# Patient Record
Sex: MALE | Race: WHITE | NOT HISPANIC OR LATINO | Employment: FULL TIME | URBAN - METROPOLITAN AREA
[De-identification: names, ages, dates, MRNs, and addresses within clinical notes are randomized per-mention and may not be internally consistent; named-entity substitution may affect disease eponyms.]

---

## 2017-10-26 ENCOUNTER — OFFICE VISIT (OUTPATIENT)
Dept: MEDICAL GROUP | Facility: MEDICAL CENTER | Age: 72
End: 2017-10-26
Payer: COMMERCIAL

## 2017-10-26 VITALS
OXYGEN SATURATION: 95 % | RESPIRATION RATE: 16 BRPM | DIASTOLIC BLOOD PRESSURE: 68 MMHG | BODY MASS INDEX: 24 KG/M2 | TEMPERATURE: 98.6 F | HEART RATE: 78 BPM | SYSTOLIC BLOOD PRESSURE: 130 MMHG | WEIGHT: 193 LBS | HEIGHT: 75 IN

## 2017-10-26 DIAGNOSIS — H91.93 BILATERAL HEARING LOSS, UNSPECIFIED HEARING LOSS TYPE: ICD-10-CM

## 2017-10-26 DIAGNOSIS — K76.0 FATTY LIVER: ICD-10-CM

## 2017-10-26 DIAGNOSIS — G25.81 RESTLESS LEG SYNDROME: ICD-10-CM

## 2017-10-26 DIAGNOSIS — K21.9 GASTROESOPHAGEAL REFLUX DISEASE WITHOUT ESOPHAGITIS: ICD-10-CM

## 2017-10-26 DIAGNOSIS — J20.9 ACUTE BRONCHITIS, UNSPECIFIED ORGANISM: ICD-10-CM

## 2017-10-26 DIAGNOSIS — R97.20 ELEVATED PSA: ICD-10-CM

## 2017-10-26 DIAGNOSIS — Z12.11 SCREENING FOR COLON CANCER: ICD-10-CM

## 2017-10-26 DIAGNOSIS — R09.89 RIGHT CAROTID BRUIT: ICD-10-CM

## 2017-10-26 DIAGNOSIS — M47.22 OSTEOARTHRITIS OF SPINE WITH RADICULOPATHY, CERVICAL REGION: ICD-10-CM

## 2017-10-26 DIAGNOSIS — E78.49 OTHER HYPERLIPIDEMIA: ICD-10-CM

## 2017-10-26 DIAGNOSIS — Z87.09 HISTORY OF ASTHMA: ICD-10-CM

## 2017-10-26 DIAGNOSIS — F51.01 PRIMARY INSOMNIA: ICD-10-CM

## 2017-10-26 DIAGNOSIS — R73.9 HYPERGLYCEMIA: ICD-10-CM

## 2017-10-26 PROBLEM — M47.812 CERVICAL SPINE DEGENERATION: Status: ACTIVE | Noted: 2017-10-26

## 2017-10-26 PROCEDURE — 99204 OFFICE O/P NEW MOD 45 MIN: CPT | Performed by: PHYSICIAN ASSISTANT

## 2017-10-26 RX ORDER — BUDESONIDE AND FORMOTEROL FUMARATE DIHYDRATE 160; 4.5 UG/1; UG/1
2 AEROSOL RESPIRATORY (INHALATION) 2 TIMES DAILY
Qty: 1 INHALER | Refills: 6 | Status: SHIPPED | OUTPATIENT
Start: 2017-10-26 | End: 2018-04-30

## 2017-10-26 RX ORDER — MONTELUKAST SODIUM 10 MG/1
10 TABLET ORAL DAILY
Qty: 30 TAB | Refills: 6 | Status: SHIPPED | OUTPATIENT
Start: 2017-10-26 | End: 2018-07-10 | Stop reason: SDUPTHER

## 2017-10-26 RX ORDER — DOXYCYCLINE HYCLATE 100 MG
100 TABLET ORAL 2 TIMES DAILY
Qty: 20 TAB | Refills: 0 | Status: SHIPPED | OUTPATIENT
Start: 2017-10-26 | End: 2017-11-05

## 2017-10-26 RX ORDER — ZOLPIDEM TARTRATE 10 MG/1
TABLET ORAL
COMMUNITY
Start: 2017-09-08 | End: 2018-04-30

## 2017-10-26 RX ORDER — MECOBALAMIN 5000 MCG
15 TABLET,DISINTEGRATING ORAL
COMMUNITY
End: 2018-04-30

## 2017-10-26 RX ORDER — TADALAFIL 20 MG/1
20 TABLET ORAL PRN
COMMUNITY
End: 2018-03-23 | Stop reason: SDUPTHER

## 2017-10-26 RX ORDER — GABAPENTIN 100 MG/1
CAPSULE ORAL
Refills: 0 | COMMUNITY
Start: 2017-08-03 | End: 2018-03-23 | Stop reason: SDUPTHER

## 2017-10-26 RX ORDER — CHLORAL HYDRATE 500 MG
1000 CAPSULE ORAL
COMMUNITY

## 2017-10-26 RX ORDER — FLUTICASONE PROPIONATE 50 MCG
1 SPRAY, SUSPENSION (ML) NASAL DAILY
COMMUNITY

## 2017-10-26 RX ORDER — ALFUZOSIN HYDROCHLORIDE 10 MG/1
10 TABLET, EXTENDED RELEASE ORAL
COMMUNITY
End: 2018-10-09 | Stop reason: SDUPTHER

## 2017-10-26 ASSESSMENT — PATIENT HEALTH QUESTIONNAIRE - PHQ9: CLINICAL INTERPRETATION OF PHQ2 SCORE: 0

## 2017-10-26 NOTE — PROGRESS NOTES
Chief Complaint   Patient presents with   • Establish Care     labs - had high A1c in last check 6 mo ago   • Other     chest congestion - started x 3 weeks, took antibiotics, went away and then came back, productive cough   • Shoulder Pain     nerve pain - chronic, don't want to do surgery or shots, would like to do PT   • Other     ear pressure, hearing issues, old ENT wanted to do MRI, talked about ear tubes       HISTORY OF THE PRESENT ILLNESS: This is a 72 y.o. male new patient to our practice. This pleasant patient is here today to discuss multiple acute and chronic issues    History of asthma  Triggered with allergy and with viruses. Pulmonology testing 10 years ago showed some mild emphysema. Doctor in [place] had prescribed inhalers for PRN use. Since moving getting worse. Took 6-7 days of amoxicillin and felt better but now coming back. Coughing up some yellow phlegm. Getting hoarse. Advair has caused voice change in the past. Interested in different inhaler. Concerned he might need an antibiotic.    Cervical spine degeneration  Neurologist and ortho did MRI. C3-4 protruding. Causes left shoulder pain. Has lost some nerve sensitivity in the left arm. Neuro and PT said to stay on top physical therapy. Last MRI2 years ago. No surgeries.    Restless leg syndrome  Chronic. Fairly well controlled on gabapentin and ambien.  Has seen neuro in [place]. Would like to see neuro here in [place].    Elevated PSA  Hx of elevated PSA. Prostate biopsy 6 years ago biopsy normal. PSA due every year.     Hyperglycemia  Hx of elevated HgA1C. No hx of diabetes. Due for retest.    Fatty liver  History of. Found on ultrasound. No f/u was rec.    Insomnia  Chronic. Currently doing relatively well on ambien and gabapentin without adverse effects.    Right carotid bruit  Has had carotid ultrasound. Was advised to have f/u in about 2-3 years. Not on statin. Not on aspirin d/t GERD    Gastroesophageal reflux disease without  esophagitis  Chronic. On prevacid.     Other hyperlipidemia  History of elevation. States improved on red yeast rice, co enzyme Q10, fish oil. Due for testing.    Bilateral hearing loss  Hx of noise induced hearing loss. Worse over the past year. ENT and audiology in [place] though middle ear cause. Had advised MRI but were then planning on doing either tympanoplasty or steroid injections into the middle ear. Moved before any of that was done. Would like to have MRI then see ENT here.    Screening for colon cancer  Hx of multiple polyps. Due for colonoscopy      Past Medical History:   Diagnosis Date   • Allergic rhinitis    • Asthma    • Asymptomatic stenosis of right carotid artery    • BPH (benign prostatic hyperplasia)    • Erectile dysfunction    • Fatty liver    • GERD (gastroesophageal reflux disease)    • Insomnia    • Restless leg syndrome        History reviewed. No pertinent surgical history.    Family Status   Relation Status   • Mother    • Father    • Sister Alive     Family History   Problem Relation Age of Onset   • Lung Disease Mother      COPD   • Cancer Father        Social History   Substance Use Topics   • Smoking status: Never Smoker   • Smokeless tobacco: Never Used   • Alcohol use Yes      Comment: 1-2 drinks per month       Allergies: Sulfa drugs    Current Outpatient Prescriptions Ordered in [facility]   Medication Sig Dispense Refill   • gabapentin (NEURONTIN) 100 MG Cap   0   • lansoprazole (PREVACID) 15 MG CAPSULE DELAYED RELEASE Take 15 mg by mouth.     • zolpidem (AMBIEN) 10 MG Tab 1/2- 1 tab nightly as needed for insomnia     • alfuzosin (UROXATRAL) 10 MG SR tablet Take 10 mg by mouth.     • tadalafil (CIALIS) 20 MG tablet Take 20 mg by mouth as needed for Erectile Dysfunction.     • vitamin D (CHOLECALCIFEROL) 1000 UNIT Tab Take 1,000 Units by mouth every day.     • fluticasone (FLONASE) 50 MCG/ACT nasal spray Spray 1 Spray in nose every day.     • multivitamin  "(THERAGRAN) Tab Take 1 Tab by mouth every day.     • Red Yeast Rice Extract (RED YEAST RICE PO) Take  by mouth.     • Omega-3 Fatty Acids (FISH OIL) 1000 MG Cap capsule Take 1,000 mg by mouth 3 times a day, with meals.     • budesonide-formoterol (SYMBICORT) 160-4.5 MCG/ACT Aerosol Inhale 2 Puffs by mouth 2 Times a Day. 1 Inhaler 6   • montelukast (SINGULAIR) 10 MG Tab Take 1 Tab by mouth every day. 30 Tab 6   • doxycycline (VIBRAMYCIN) 100 MG Tab Take 1 Tab by mouth 2 times a day for 10 days. 20 Tab 0   • omeprazole (PRILOSEC) 20 MG CPDR Take 20 mg by mouth every day.       No current [facility]-ordered facility-administered medications on file.        Review of Systems   Constitutional: Negative for fever, chills, weight loss and malaise/fatigue.   HENT: Negative for ear pain, nosebleeds, sore throat     Eyes: Negative for blurred vision.   Cardiovascular: Negative for chest pain, palpitations, orthopnea and leg swelling.   Gastrointestinal: Negative for heartburn, nausea, vomiting and abdominal pain.   Genitourinary: Negative for dysuria, urgency and frequency.   Skin: Negative for rash and itching.   Neurological: Negative for dizziness, tingling, tremors, sensory change, focal weakness and headaches.   Endo/Heme/Allergies: Does not bruise/bleed easily.   Psychiatric/Behavioral: Negative for depression, anxiety, or memory loss.     All other systems reviewed and are negative except as in HPI.    Exam: Blood pressure 130/68, pulse 78, temperature 37 °C (98.6 °F), resp. rate 16, height 1.905 m (6' 3\"), weight 87.5 kg (193 lb), SpO2 95 %.  General: Normal appearing. No distress.  HEENT: Normocephalic. Eyes conjunctiva clear lids without ptosis, pupils equal and reactive to light accommodation, ears normal shape and contour, canals are clear bilaterally, tympanic membranes are benign, nasal mucosa benign, oropharynx is without erythema, edema or exudates.   Neck: Supple without JVD or bruit. Thyroid is not " enlarged.  Pulmonary: Clear to ausculation.  Normal effort. No rales, ronchi, or wheezing.  Cardiovascular: Regular rate and rhythm without murmur. Carotid and radial pulses are intact and equal bilaterally.  Abdomen: Soft, nontender, nondistended. Normal bowel sounds. Liver and spleen are not palpable  Neurologic: Grossly nonfocal  Lymph: No cervical, supraclavicular or axillary lymph nodes are palpable  Skin: Warm and dry.  No obvious lesions.  Musculoskeletal: Normal gait. No extremity cyanosis, clubbing, or edema.  Psych: Normal mood and affect. Alert and oriented x3. Judgment and insight is normal.    Assessment/Plan  1. Hyperglycemia  COMP METABOLIC PANEL    HEMOGLOBIN A1C   2. Gastroesophageal reflux disease without esophagitis     3. History of asthma  budesonide-formoterol (SYMBICORT) 160-4.5 MCG/ACT Aerosol    montelukast (SINGULAIR) 10 MG Tab   4. Osteoarthritis of spine with radiculopathy, cervical region  REFERRAL TO PHYSICAL THERAPY Reason for Therapy: Eval/Treat/Report   5. Restless leg syndrome  REFERRAL TO NEUROLOGY   6. Screening for colon cancer  REFERRAL TO GASTROENTEROLOGY   7. Elevated PSA  PROSTATE SPECIFIC AG SCREENING   8. Bilateral hearing loss, unspecified hearing loss type  REFERRAL TO ENT    CBC WITH DIFFERENTIAL    MR-BRAIN IAC'S W/WO   9. Other hyperlipidemia  LIPID PROFILE   10. Acute bronchitis, unspecified organism  CBC WITH DIFFERENTIAL    doxycycline (VIBRAMYCIN) 100 MG Tab   11. Fatty liver     12. Primary insomnia     13. Right carotid bruit       Will get records. Will get labs and MRI. Will f/u regarding new inhaler, antibitoic.   In general will need q 6 mo f/u

## 2017-10-26 NOTE — ASSESSMENT & PLAN NOTE
Neurologist and ortho did MRI. C3-4 protruding. Causes left shoulder pain. Has lost some nerve sensitivity in the left arm. Neuro and PT said to stay on top physical therapy. Last MRI2 years ago. No surgeries.

## 2017-10-26 NOTE — LETTER
[facility]  Leisa Fink P.A.-C.  [address]  [place] [address]  Fax: [phone]   Authorization for Release/Disclosure of   Protected Health Information   Name: JOHNNY IRAHETA : 1945 SSN: [id number]   Address: [address] [place] [address] [facility] [address] Phone:    [phone] (home)    I authorize the entity listed below to release/disclose the PHI below to:   [facility]/Leisa Fink P.A.-C. and Leisa Fink P.A.-C.   Provider or Entity Name:     Address   City, State, Zip   Phone:      Fax:     Reason for request: continuity of care   Information to be released:    [  ] LAST COLONOSCOPY,  including any PATH REPORT and follow-up  [  ] LAST FIT/COLOGUARD RESULT [  ] LAST DEXA  [  ] LAST MAMMOGRAM  [  ] LAST PAP  [  ] LAST LABS [  ] RETINA EXAM REPORT  [  ] IMMUNIZATION RECORDS  [  ] Release all info      [  ] Check here and initial the line next to each item to release ALL health information INCLUDING  _____ Care and treatment for drug and / or alcohol abuse  _____ HIV testing, infection status, or AIDS  _____ Genetic Testing    DATES OF SERVICE OR TIME PERIOD TO BE DISCLOSED: _____________  I understand and acknowledge that:  * This Authorization may be revoked at any time by you in writing, except if your health information has already been used or disclosed.  * Your health information that will be used or disclosed as a result of you signing this authorization could be re-disclosed by the recipient. If this occurs, your re-disclosed health information may no longer be protected by State or Federal laws.  * You may refuse to sign this Authorization. Your refusal will not affect your ability to obtain treatment.  * This Authorization becomes effective upon signing and will  on (date) __________.      If no date is indicated, this Authorization will  one (1) year from the signature date.    Name: Johnny Iraheta    Signature:   Date:     10/26/2017       PLEASE FAX  REQUESTED RECORDS BACK TO: [phone]

## 2017-10-26 NOTE — ASSESSMENT & PLAN NOTE
Has had carotid ultrasound. Was advised to have f/u in about 2-3 years. Not on statin. Not on aspirin d/t GERD

## 2017-10-26 NOTE — ASSESSMENT & PLAN NOTE
History of elevation. States improved on red yeast rice, co enzyme Q10, fish oil. Due for testing.

## 2017-10-26 NOTE — ASSESSMENT & PLAN NOTE
Chronic. Fairly well controlled on gabapentin and ambien.  Has seen neuro in [place]. Would like to see neuro here in [place].

## 2017-10-26 NOTE — ASSESSMENT & PLAN NOTE
Triggered with allergy and with viruses. Pulmonology testing 10 years ago showed some mild emphysema. Doctor in [place] had prescribed inhalers for PRN use. Since moving getting worse. Took 6-7 days of amoxicillin and felt better but now coming back. Coughing up some yellow phlegm. Getting hoarse. Advair has caused voice change in the past. Interested in different inhaler. Concerned he might need an antibiotic.

## 2017-10-30 ENCOUNTER — TELEPHONE (OUTPATIENT)
Dept: MEDICAL GROUP | Facility: MEDICAL CENTER | Age: 72
End: 2017-10-30

## 2017-10-30 DIAGNOSIS — F40.240 CLAUSTROPHOBIA: ICD-10-CM

## 2017-10-30 RX ORDER — DIAZEPAM 5 MG/1
5 TABLET ORAL ONCE
Qty: 1 TAB | Refills: 0 | Status: SHIPPED
Start: 2017-10-30 | End: 2017-10-30

## 2017-10-31 ENCOUNTER — PATIENT MESSAGE (OUTPATIENT)
Dept: MEDICAL GROUP | Facility: MEDICAL CENTER | Age: 72
End: 2017-10-31

## 2017-10-31 DIAGNOSIS — F51.01 PRIMARY INSOMNIA: ICD-10-CM

## 2017-10-31 RX ORDER — ZOLPIDEM TARTRATE 10 MG/1
10 TABLET ORAL NIGHTLY PRN
Qty: 30 TAB | Refills: 6 | Status: SHIPPED
Start: 2017-10-31 | End: 2018-05-30 | Stop reason: SDUPTHER

## 2017-10-31 NOTE — TELEPHONE ENCOUNTER
From: Johnny Iraheta  To: Leisa Fink P.A.-C.  Sent: 10/31/2017 10:39 AM PDT  Subject: Non-Urgent Medical Question    First, thank you for seeing my wife and I and taking us in to your practice. Terrific team, very impressed with DEANDRA De La Fuente and Nurse Jailyn.    Second, thank you for the call Jailyn re  prescription for the my upcoming MRI..    Third, both the MRI referral and Colonoscopy referral are both set, appreciate how quick that occurred.    Fourth, Should I try and book a physical therapy based on who is covered by my primary insurance and proximity.    Fifth and lastly, generic ambien, Zolpidem Tartrate. Could I transfer that prescription to you. Would be good to do the 20 MG, and cut in half on non travel nights ready for a refill, traveling again leaving Thursday be back on Tuesday I believe my last refill runs out on Sat.    Thank you,  Solitario

## 2017-11-07 ENCOUNTER — TELEPHONE (OUTPATIENT)
Dept: MEDICAL GROUP | Facility: MEDICAL CENTER | Age: 72
End: 2017-11-07

## 2017-11-07 NOTE — TELEPHONE ENCOUNTER
----- Message from Teresita Márquez sent at 11/7/2017 11:28 AM PST -----  Regarding: Referral to PT   Contact: [phone]  Jay Jaeger from [facility] called today regarding this pt - I did let him know it wouldn't be immediate as you're seeing patients today, but did let him know he could expect a call back sometime today, he's wondering if he can get a copy of a referral for this pt. either Jay or Mery () at [phone]    Teresita

## 2017-11-10 ENCOUNTER — HOSPITAL ENCOUNTER (OUTPATIENT)
Dept: LAB | Facility: MEDICAL CENTER | Age: 72
End: 2017-11-10
Attending: PHYSICIAN ASSISTANT
Payer: OTHER MISCELLANEOUS

## 2017-11-10 DIAGNOSIS — R97.20 ELEVATED PSA: ICD-10-CM

## 2017-11-10 DIAGNOSIS — H91.93 BILATERAL HEARING LOSS, UNSPECIFIED HEARING LOSS TYPE: ICD-10-CM

## 2017-11-10 DIAGNOSIS — E78.49 OTHER HYPERLIPIDEMIA: ICD-10-CM

## 2017-11-10 DIAGNOSIS — J20.9 ACUTE BRONCHITIS, UNSPECIFIED ORGANISM: ICD-10-CM

## 2017-11-10 DIAGNOSIS — R73.9 HYPERGLYCEMIA: ICD-10-CM

## 2017-11-10 LAB
ALBUMIN SERPL BCP-MCNC: 4.4 G/DL (ref 3.2–4.9)
ALBUMIN/GLOB SERPL: 1.6 G/DL
ALP SERPL-CCNC: 55 U/L (ref 30–99)
ALT SERPL-CCNC: 20 U/L (ref 2–50)
ANION GAP SERPL CALC-SCNC: 6 MMOL/L (ref 0–11.9)
AST SERPL-CCNC: 19 U/L (ref 12–45)
BASOPHILS # BLD AUTO: 0.9 % (ref 0–1.8)
BASOPHILS # BLD: 0.06 K/UL (ref 0–0.12)
BILIRUB SERPL-MCNC: 0.6 MG/DL (ref 0.1–1.5)
BUN SERPL-MCNC: 19 MG/DL (ref 8–22)
CALCIUM SERPL-MCNC: 9.5 MG/DL (ref 8.5–10.5)
CHLORIDE SERPL-SCNC: 104 MMOL/L (ref 96–112)
CHOLEST SERPL-MCNC: 184 MG/DL (ref 100–199)
CO2 SERPL-SCNC: 29 MMOL/L (ref 20–33)
CREAT SERPL-MCNC: 0.97 MG/DL (ref 0.5–1.4)
EOSINOPHIL # BLD AUTO: 0.11 K/UL (ref 0–0.51)
EOSINOPHIL NFR BLD: 1.7 % (ref 0–6.9)
ERYTHROCYTE [DISTWIDTH] IN BLOOD BY AUTOMATED COUNT: 42.5 FL (ref 35.9–50)
EST. AVERAGE GLUCOSE BLD GHB EST-MCNC: 148 MG/DL
GFR SERPL CREATININE-BSD FRML MDRD: >60 ML/MIN/1.73 M 2
GLOBULIN SER CALC-MCNC: 2.8 G/DL (ref 1.9–3.5)
GLUCOSE SERPL-MCNC: 107 MG/DL (ref 65–99)
HBA1C MFR BLD: 6.8 % (ref 0–5.6)
HCT VFR BLD AUTO: 45.1 % (ref 42–52)
HDLC SERPL-MCNC: 34 MG/DL
HGB BLD-MCNC: 15.3 G/DL (ref 14–18)
IMM GRANULOCYTES # BLD AUTO: 0.01 K/UL (ref 0–0.11)
IMM GRANULOCYTES NFR BLD AUTO: 0.2 % (ref 0–0.9)
LDLC SERPL CALC-MCNC: 126 MG/DL
LYMPHOCYTES # BLD AUTO: 1.44 K/UL (ref 1–4.8)
LYMPHOCYTES NFR BLD: 22.7 % (ref 22–41)
MCH RBC QN AUTO: 30.8 PG (ref 27–33)
MCHC RBC AUTO-ENTMCNC: 33.9 G/DL (ref 33.7–35.3)
MCV RBC AUTO: 90.9 FL (ref 81.4–97.8)
MONOCYTES # BLD AUTO: 0.56 K/UL (ref 0–0.85)
MONOCYTES NFR BLD AUTO: 8.8 % (ref 0–13.4)
NEUTROPHILS # BLD AUTO: 4.15 K/UL (ref 1.82–7.42)
NEUTROPHILS NFR BLD: 65.7 % (ref 44–72)
NRBC # BLD AUTO: 0 K/UL
NRBC BLD AUTO-RTO: 0 /100 WBC
PLATELET # BLD AUTO: 221 K/UL (ref 164–446)
PMV BLD AUTO: 11.4 FL (ref 9–12.9)
POTASSIUM SERPL-SCNC: 4.5 MMOL/L (ref 3.6–5.5)
PROT SERPL-MCNC: 7.2 G/DL (ref 6–8.2)
PSA SERPL-MCNC: 8.26 NG/ML (ref 0–4)
RBC # BLD AUTO: 4.96 M/UL (ref 4.7–6.1)
SODIUM SERPL-SCNC: 139 MMOL/L (ref 135–145)
TRIGL SERPL-MCNC: 119 MG/DL (ref 0–149)
WBC # BLD AUTO: 6.3 K/UL (ref 4.8–10.8)

## 2017-11-10 PROCEDURE — 84153 ASSAY OF PSA TOTAL: CPT

## 2017-11-10 PROCEDURE — 83036 HEMOGLOBIN GLYCOSYLATED A1C: CPT

## 2017-11-10 PROCEDURE — 80061 LIPID PANEL: CPT

## 2017-11-10 PROCEDURE — 36415 COLL VENOUS BLD VENIPUNCTURE: CPT

## 2017-11-10 PROCEDURE — 80053 COMPREHEN METABOLIC PANEL: CPT

## 2017-11-10 PROCEDURE — 85025 COMPLETE CBC W/AUTO DIFF WBC: CPT

## 2017-11-13 ENCOUNTER — HOSPITAL ENCOUNTER (OUTPATIENT)
Dept: RADIOLOGY | Facility: MEDICAL CENTER | Age: 72
End: 2017-11-13
Attending: PHYSICIAN ASSISTANT
Payer: OTHER MISCELLANEOUS

## 2017-11-13 ENCOUNTER — TELEPHONE (OUTPATIENT)
Dept: MEDICAL GROUP | Facility: MEDICAL CENTER | Age: 72
End: 2017-11-13

## 2017-11-13 DIAGNOSIS — H91.93 BILATERAL HEARING LOSS, UNSPECIFIED HEARING LOSS TYPE: ICD-10-CM

## 2017-11-13 PROCEDURE — 700117 HCHG RX CONTRAST REV CODE 255: Performed by: PHYSICIAN ASSISTANT

## 2017-11-13 PROCEDURE — 70553 MRI BRAIN STEM W/O & W/DYE: CPT

## 2017-11-13 PROCEDURE — A9579 GAD-BASE MR CONTRAST NOS,1ML: HCPCS | Performed by: PHYSICIAN ASSISTANT

## 2017-11-13 RX ADMIN — GADODIAMIDE 20 ML: 287 INJECTION INTRAVENOUS at 08:46

## 2017-11-13 NOTE — TELEPHONE ENCOUNTER
----- Message from Leisa Fink P.A.-C. sent at 11/13/2017  9:47 AM PST -----  Please call and verify patient receives results. Thanks! Leisa Fink PA-C

## 2018-01-15 ENCOUNTER — PATIENT MESSAGE (OUTPATIENT)
Dept: MEDICAL GROUP | Facility: MEDICAL CENTER | Age: 73
End: 2018-01-15

## 2018-01-15 DIAGNOSIS — N52.9 ERECTILE DYSFUNCTION, UNSPECIFIED ERECTILE DYSFUNCTION TYPE: ICD-10-CM

## 2018-01-16 RX ORDER — TADALAFIL 20 MG/1
20 TABLET ORAL PRN
Qty: 4 TAB | Refills: 6 | Status: SHIPPED | OUTPATIENT
Start: 2018-01-16 | End: 2018-02-15

## 2018-03-05 ENCOUNTER — OFFICE VISIT (OUTPATIENT)
Dept: MEDICAL GROUP | Facility: CLINIC | Age: 73
End: 2018-03-05
Payer: OTHER MISCELLANEOUS

## 2018-03-05 VITALS
DIASTOLIC BLOOD PRESSURE: 70 MMHG | HEART RATE: 70 BPM | WEIGHT: 200 LBS | OXYGEN SATURATION: 97 % | TEMPERATURE: 98.2 F | BODY MASS INDEX: 25 KG/M2 | SYSTOLIC BLOOD PRESSURE: 124 MMHG | RESPIRATION RATE: 16 BRPM

## 2018-03-05 DIAGNOSIS — J40 BRONCHITIS: ICD-10-CM

## 2018-03-05 PROCEDURE — 99213 OFFICE O/P EST LOW 20 MIN: CPT | Performed by: NURSE PRACTITIONER

## 2018-03-05 RX ORDER — DOXYCYCLINE HYCLATE 100 MG
100 TABLET ORAL 2 TIMES DAILY
Qty: 14 TAB | Refills: 0 | Status: SHIPPED | OUTPATIENT
Start: 2018-03-05 | End: 2018-03-05

## 2018-03-05 RX ORDER — DOXYCYCLINE HYCLATE 100 MG
100 TABLET ORAL 2 TIMES DAILY
Qty: 14 TAB | Refills: 0 | Status: SHIPPED | OUTPATIENT
Start: 2018-03-05 | End: 2018-03-12

## 2018-03-05 ASSESSMENT — ENCOUNTER SYMPTOMS
SHORTNESS OF BREATH: 0
MYALGIAS: 0
SPUTUM PRODUCTION: 1
SORE THROAT: 0
COUGH: 1
WHEEZING: 0
FEVER: 0
SINUS PAIN: 0

## 2018-03-05 NOTE — PROGRESS NOTES
Chief Complaint   Patient presents with   • URI     stuffy nose/ post nasal drip productive cough x        HISTORY OF PRESENT ILLNESS: Patient is a 73 y.o. male established patient who presents today due to 4 days hx of stuffy nose, post nasal drip and productive coughing. Pt reports he has similar condition in the past which require antibiotic to get riddle of it. He is afraid to wait and end up having pneumonia. He reports no fever or chills, no sob or wheezing. He is going to travel tomorrow. He has inhaler, symbicort, on singular for his asthma. He also uses flonase. He does not appear sign of asthma exacerbation at this time.       Patient Active Problem List    Diagnosis Date Noted   • Hyperglycemia 10/26/2017   • Gastroesophageal reflux disease without esophagitis 10/26/2017   • History of asthma 10/26/2017   • Cervical spine degeneration 10/26/2017   • Other hyperlipidemia 10/26/2017   • Bilateral hearing loss 10/26/2017   • Screening for colon cancer 10/26/2017   • Elevated PSA 06/30/2016   • Insomnia 06/30/2016   • Restless leg syndrome 06/30/2016   • Right carotid bruit 06/30/2016   • Fatty liver 06/14/2016       Allergies:Sulfa drugs    Current Outpatient Prescriptions   Medication Sig Dispense Refill   • doxycycline (VIBRAMYCIN) 100 MG Tab Take 1 Tab by mouth 2 times a day for 7 days. 14 Tab 0   • zolpidem (AMBIEN) 10 MG Tab Take 1 Tab by mouth at bedtime as needed for Sleep. 30 Tab 6   • lansoprazole (PREVACID) 15 MG CAPSULE DELAYED RELEASE Take 15 mg by mouth.     • alfuzosin (UROXATRAL) 10 MG SR tablet Take 10 mg by mouth.     • vitamin D (CHOLECALCIFEROL) 1000 UNIT Tab Take 1,000 Units by mouth every day.     • multivitamin (THERAGRAN) Tab Take 1 Tab by mouth every day.     • Red Yeast Rice Extract (RED YEAST RICE PO) Take  by mouth.     • Omega-3 Fatty Acids (FISH OIL) 1000 MG Cap capsule Take 1,000 mg by mouth 3 times a day, with meals.     • budesonide-formoterol (SYMBICORT) 160-4.5 MCG/ACT  Aerosol Inhale 2 Puffs by mouth 2 Times a Day. 1 Inhaler 6   • montelukast (SINGULAIR) 10 MG Tab Take 1 Tab by mouth every day. 30 Tab 6   • gabapentin (NEURONTIN) 100 MG Cap   0   • zolpidem (AMBIEN) 10 MG Tab 1/2- 1 tab nightly as needed for insomnia     • tadalafil (CIALIS) 20 MG tablet Take 20 mg by mouth as needed for Erectile Dysfunction.     • fluticasone (FLONASE) 50 MCG/ACT nasal spray Spray 1 Spray in nose every day.     • omeprazole (PRILOSEC) 20 MG CPDR Take 20 mg by mouth every day.       No current facility-administered medications for this visit.        Social History   Substance Use Topics   • Smoking status: Never Smoker   • Smokeless tobacco: Never Used   • Alcohol use Yes      Comment: 1-2 drinks per month       Family Status   Relation Status   • Mother    • Father    • Sister Alive     Family History   Problem Relation Age of Onset   • Lung Disease Mother      COPD   • Cancer Father          ROS:  Review of Systems   Constitutional: Negative for fever.   HENT: Negative for congestion, sinus pain and sore throat.    Respiratory: Positive for cough and sputum production. Negative for shortness of breath and wheezing.    Musculoskeletal: Negative for myalgias.        Objective:     Blood pressure 124/70, pulse 70, temperature 36.8 °C (98.2 °F), resp. rate 16, weight 90.7 kg (200 lb), SpO2 97 %.     Physical Exam:  Physical Exam   Constitutional: He is oriented to person, place, and time and well-developed, well-nourished, and in no distress.   HENT:   Head: Normocephalic and atraumatic.   Eyes: Conjunctivae are normal.   Neck: Neck supple. No JVD present.   Cardiovascular: Regular rhythm.    Pulmonary/Chest: Breath sounds normal. No respiratory distress. He has no wheezes. He has no rales.   Musculoskeletal: Normal range of motion.   Neurological: He is alert and oriented to person, place, and time.   Skin: Skin is warm. No erythema.   Vitals reviewed.        Assessment/Plan:  1.  Bronchitis  Take abx only if worsening symptoms in two days.   - doxycycline (VIBRAMYCIN) 100 MG Tab; Take 1 Tab by mouth 2 times a day for 7 days.  Dispense: 14 Tab; Refill: 0  - can take OTC coughing medicine. Pt can fill and carry back up antibiotic with him when traveling.   - Pt education regarding bronchitis is provided.     Differential diagnoses and indications for immediate follow-up discussed with patient.    Instructed to return to clinic or nearest emergency department for any change in condition, further concerns, or worsening of symptoms.    The patient demonstrated a good understanding and agreed with the treatment plan.

## 2018-03-05 NOTE — PATIENT INSTRUCTIONS

## 2018-03-23 ENCOUNTER — PATIENT MESSAGE (OUTPATIENT)
Dept: MEDICAL GROUP | Facility: MEDICAL CENTER | Age: 73
End: 2018-03-23

## 2018-03-23 RX ORDER — GABAPENTIN 100 MG/1
100 CAPSULE ORAL 3 TIMES DAILY
Qty: 90 CAP | Refills: 3 | Status: SHIPPED | OUTPATIENT
Start: 2018-03-23 | End: 2018-09-20 | Stop reason: SDUPTHER

## 2018-03-23 RX ORDER — TADALAFIL 20 MG/1
20 TABLET ORAL PRN
Qty: 10 TAB | Refills: 3 | Status: SHIPPED | OUTPATIENT
Start: 2018-03-23 | End: 2018-04-30 | Stop reason: SDUPTHER

## 2018-03-23 NOTE — TELEPHONE ENCOUNTER
From: Johnny Page  To: Leisa Fink P.A.-C.  Sent: 3/23/2018 1:54 PM PDT  Subject: Prescription Question    Hello,  Could you please refill two prescriptions; Cialis and Gabapentin. We have changed pharmacies we are now using [facility] in [place].  All else is good for me.  Thanks,  Solitario

## 2018-04-30 ENCOUNTER — OFFICE VISIT (OUTPATIENT)
Dept: MEDICAL GROUP | Facility: PHYSICIAN GROUP | Age: 73
End: 2018-04-30
Payer: OTHER MISCELLANEOUS

## 2018-04-30 VITALS
RESPIRATION RATE: 19 BRPM | HEIGHT: 75 IN | HEART RATE: 74 BPM | TEMPERATURE: 98 F | WEIGHT: 201.9 LBS | OXYGEN SATURATION: 97 % | DIASTOLIC BLOOD PRESSURE: 74 MMHG | BODY MASS INDEX: 25.1 KG/M2 | SYSTOLIC BLOOD PRESSURE: 112 MMHG

## 2018-04-30 DIAGNOSIS — R97.20 ELEVATED PSA: ICD-10-CM

## 2018-04-30 DIAGNOSIS — E78.49 OTHER HYPERLIPIDEMIA: ICD-10-CM

## 2018-04-30 DIAGNOSIS — G25.81 RESTLESS LEG SYNDROME: ICD-10-CM

## 2018-04-30 DIAGNOSIS — Z87.09 HISTORY OF ASTHMA: ICD-10-CM

## 2018-04-30 DIAGNOSIS — E11.9 DIABETES MELLITUS TYPE 2, DIET-CONTROLLED (HCC): ICD-10-CM

## 2018-04-30 DIAGNOSIS — N52.9 ERECTILE DYSFUNCTION, UNSPECIFIED ERECTILE DYSFUNCTION TYPE: ICD-10-CM

## 2018-04-30 DIAGNOSIS — K92.1 HEMATOCHEZIA: ICD-10-CM

## 2018-04-30 DIAGNOSIS — F51.01 PRIMARY INSOMNIA: ICD-10-CM

## 2018-04-30 DIAGNOSIS — J45.40 MODERATE PERSISTENT ASTHMA WITHOUT COMPLICATION: ICD-10-CM

## 2018-04-30 PROBLEM — H91.13 PRESBYCUSIS OF BOTH EARS: Status: ACTIVE | Noted: 2018-04-30

## 2018-04-30 PROCEDURE — 99214 OFFICE O/P EST MOD 30 MIN: CPT | Performed by: FAMILY MEDICINE

## 2018-04-30 RX ORDER — TADALAFIL 20 MG/1
20 TABLET ORAL PRN
Qty: 4 TAB | Refills: 5 | Status: SHIPPED | OUTPATIENT
Start: 2018-04-30 | End: 2018-07-10 | Stop reason: SDUPTHER

## 2018-05-02 ENCOUNTER — TELEPHONE (OUTPATIENT)
Dept: MEDICAL GROUP | Facility: PHYSICIAN GROUP | Age: 73
End: 2018-05-02

## 2018-05-02 PROBLEM — Z12.11 SCREENING FOR COLON CANCER: Status: RESOLVED | Noted: 2017-10-26 | Resolved: 2018-05-02

## 2018-05-02 PROBLEM — R73.9 HYPERGLYCEMIA: Status: RESOLVED | Noted: 2017-10-26 | Resolved: 2018-05-02

## 2018-05-02 PROBLEM — H91.93 BILATERAL HEARING LOSS: Status: RESOLVED | Noted: 2017-10-26 | Resolved: 2018-05-02

## 2018-05-02 PROBLEM — J45.40 MODERATE PERSISTENT ASTHMA WITHOUT COMPLICATION: Status: ACTIVE | Noted: 2017-10-26

## 2018-05-02 RX ORDER — TRAZODONE HYDROCHLORIDE 50 MG/1
50 TABLET ORAL NIGHTLY PRN
Qty: 60 TAB | Refills: 0 | Status: SHIPPED | OUTPATIENT
Start: 2018-05-02 | End: 2018-05-30

## 2018-05-02 NOTE — ASSESSMENT & PLAN NOTE
A hemoglobin A1c done in the last few months was 6.7. He is not aware that he has blood sugar issues within the range of diabetes. He is on any medications for this and denies symptoms.

## 2018-05-02 NOTE — ASSESSMENT & PLAN NOTE
He has been treated with Requip in the past but did not tolerate that. This point he is using marijuana.

## 2018-05-02 NOTE — TELEPHONE ENCOUNTER
Pt wife states the pt is having a hard time with the melatonin, he is wanting to try the trazodone. Is this ok to start?

## 2018-05-02 NOTE — PROGRESS NOTES
CC:  Bleeding, sleep problems    HISTORY OF THE PRESENT ILLNESS: Patient is a 73 y.o. male. This pleasant patient is here today to establish care and discuss the following.    Health Maintenance: reiewed      Elevated PSA  He has had an elevated PSA up to a level of 10 over the last 15 years. This has been followed by a urologist and biopsies were done once in the past which were negative. His last PSA was approximately 8.    Insomnia  He has had insomnia for his entire life. He has difficulty staying asleep and wakes up several hours and sleeping. Getting 5 hours of sleep a night is good for him. He has been using Ambien for this in the past. He has also been treated for restless leg syndrome. Currently he is using marijuana for restless legs which helps.    Restless leg syndrome  He has been treated with Requip in the past but did not tolerate that. This point he is using marijuana.    Moderate persistent asthma without complication  He uses Advair twice a day for his asthma. He has not had pulmonary function tests done recently. He is asymptomatic on the Advair.    Other hyperlipidemia  He is taking red yeast rice for his hyperlipidemia. He has not had labs done recently.    Hematochezia  History of hemorrhoids that occasionally bleed. While traveling a few weeks ago he had a hard stool and had been bleeding since. He had a second hard stool about a week ago with recurrence of the bleeding. Over the last few days this has been improving and he notes only a small amount of pinkish fluid on the toilet paper after wiping. He is due for colonoscopy and has a referral already.    Diabetes mellitus type 2, diet-controlled (HCC)  A hemoglobin A1c done in the last few months was 6.7. He is not aware that he has blood sugar issues within the range of diabetes. He is on any medications for this and denies symptoms.      Allergies: Sulfa drugs    Current Outpatient Prescriptions Ordered in [employer]   Medication Sig Dispense  Refill   • tadalafil (CIALIS) 20 MG tablet Take 1 Tab by mouth as needed for Erectile Dysfunction. 4 Tab 5   • fluticasone-salmeterol (ADVAIR DISKUS) 100-50 MCG/DOSE AEROSOL POWDER, BREATH ACTIVATED Inhale 1 Puff by mouth 2 times a day. 1 Inhaler 2   • gabapentin (NEURONTIN) 100 MG Cap Take 1 Cap by mouth 3 times a day. 90 Cap 3   • zolpidem (AMBIEN) 10 MG Tab Take 1 Tab by mouth at bedtime as needed for Sleep. 30 Tab 6   • alfuzosin (UROXATRAL) 10 MG SR tablet Take 10 mg by mouth.     • vitamin D (CHOLECALCIFEROL) 1000 UNIT Tab Take 1,000 Units by mouth every day.     • multivitamin (THERAGRAN) Tab Take 1 Tab by mouth every day.     • Red Yeast Rice Extract (RED YEAST RICE PO) Take  by mouth.     • Omega-3 Fatty Acids (FISH OIL) 1000 MG Cap capsule Take 1,000 mg by mouth 3 times a day, with meals.     • montelukast (SINGULAIR) 10 MG Tab Take 1 Tab by mouth every day. 30 Tab 6   • omeprazole (PRILOSEC) 20 MG CPDR Take 20 mg by mouth every day.     • traZODone (DESYREL) 50 MG Tab Take 1 Tab by mouth at bedtime as needed for Sleep for up to 30 days. May increase dose to 2 tabs nightly if not effective with 1 tab only. 60 Tab 0   • fluticasone (FLONASE) 50 MCG/ACT nasal spray Spray 1 Spray in nose every day.       No current [facility]-ordered facility-administered medications on file.        Past Medical History:   Diagnosis Date   • Allergic rhinitis    • Asthma    • Asymptomatic stenosis of right carotid artery    • BPH (benign prostatic hyperplasia)    • Erectile dysfunction    • Fatty liver    • GERD (gastroesophageal reflux disease)    • Insomnia    • Restless leg syndrome        History reviewed. No pertinent surgical history.    Social History   Substance Use Topics   • Smoking status: Former Smoker     Packs/day: 1.00     Years: 15.00     Types: Cigarettes     Quit date: 4/30/2008   • Smokeless tobacco: Never Used   • Alcohol use Yes      Comment: 1-2 drinks per month       Social History     Social History  "Mike    Works for an [employer] in [employer]       Family History   Problem Relation Age of Onset   • Lung Disease Mother      COPD   • Cancer Father        ROS:     - Constitutional: Negative for fever, chills, unexpected weight change, and fatigue/generalized weakness.     - HEENT: Negative for headaches, vision changes, hearing changes, ear pain, ear discharge, rhinorrhea, sinus congestion, sore throat, and neck pain.      - Respiratory: Negative for cough, sputum production, chest congestion, dyspnea, wheezing, and crackles.      - Cardiovascular: Negative for chest pain, palpitations, orthopnea, and bilateral lower extremity edema.     - Gastrointestinal: Negative for heartburn, nausea, vomiting, abdominal pain, melena, diarrhea,  and greasy/foul-smelling stools.     - Genitourinary: Negative for dysuria, polyuria, hematuria, pyuria, urinary urgency, and urinary incontinence.    - Musculoskeletal: Negative for myalgias, back pain, and joint pain.     - Skin: Negative for rash, itching, cyanotic skin color change.     - Neurological: Negative for dizziness, tingling, tremors, focal sensory deficit, focal weakness and headaches.     - Endo/Heme/Allergies: Does not bruise/bleed easily.     - Psychiatric/Behavioral: Negative for depression, suicidal/homicidal ideation and memory loss.       Exam: Blood pressure 112/74, pulse 74, temperature 36.7 °C (98 °F), resp. rate 19, height 1.905 m (6' 3\"), weight 91.6 kg (201 lb 14.4 oz), SpO2 97 %. Body mass index is 25.24 kg/m².    General: Normal appearing. No distress.  HEENT: Normocephalic. Eyes conjunctiva clear lids without ptosis, pupils equal and reactive to light accommodation, ears normal shape and contour, canals are clear bilaterally, tympanic membranes are benign, nasal mucosa benign, oropharynx is without erythema, edema or exudates.   Neck: Supple without JVD or bruit. Thyroid is not enlarged.  Pulmonary: Clear to ausculation.  Normal " effort. No rales, ronchi, or wheezing.  Cardiovascular: Regular rate and rhythm without murmur. Carotid and radial pulses are intact and equal bilaterally.  Abdomen: Soft, nontender, nondistended. Normal bowel sounds. Liver and spleen are not palpable  Rectal: chaperone: Breanne Yeoman small external hemorrhoids, no masses or active bleeding or fissures noted on exam  Neurologic: Grossly nonfocal  Lymph: No cervical, supraclavicular or axillary lymph nodes are palpable  Skin: Warm and dry.  No obvious lesions.  Musculoskeletal: Normal gait. No extremity cyanosis, clubbing, or edema.  Psych: Normal mood and affect. Alert and oriented x3. Judgment and insight is normal.    Please note that this dictation was created using voice recognition software. I have made every reasonable attempt to correct obvious errors, but I expect that there are errors of grammar and possibly content that I did not discover before finalizing the note.      Assessment/Plan  1. Hematochezia  This was likely due to his recent episode of constipation in the setting of her known hemorrhoids. The bleeding has stopped nasal having any symptoms of anemia. He is encouraged to keep his stool soft and strategies were discussed for this. He is also needing a colonoscopy to confirm that there are no concerns for malignancy and he has this ordered already.    2. Erectile dysfunction, unspecified erectile dysfunction type  This is stable and treated with cialis.  Side effects of the medication including hypotension were reviewed.  - tadalafil (CIALIS) 20 MG tablet; Take 1 Tab by mouth as needed for Erectile Dysfunction.  Dispense: 4 Tab; Refill: 5    3. History of asthma  He may have active asthma versus COPD giving his ongoing need of Advair. We will evaluate PFTs for a reversible obstruction.  - fluticasone-salmeterol (ADVAIR DISKUS) 100-50 MCG/DOSE AEROSOL POWDER, BREATH ACTIVATED; Inhale 1 Puff by mouth 2 times a day.  Dispense: 1 Inhaler; Refill:  2  - PULMONARY FUNCTION TESTS Test requested: Spirometry, simple    4. Diabetes mellitus type 2, diet-controlled (HCC)  This is a new diagnosis and is currently diet controlled. We will reassess Control prior to his next visit.  - HEMOGLOBIN A1C; Future    5. Elevated PSA  This has been stable but should be followed by urology due to the continued elevation.  - REFERRAL TO UROLOGY    6. Other hyperlipidemia  We will reassess his cardiovascular risk and whether additional treatments are indicated.  - LIPID PROFILE; Future    7. Primary insomnia  Is primary insomnia for which she has been on Ambien in the past. This can be continued in this clinic but he would need to stop the medical marijuana.  Other options include trazodone and melatonin.The office policy regarding prescription of controlled substances was also reviewed.  He will try other options first.      8. Restless leg syndrome  We can consider alternate options for this if he wants to stop the Marijuana.     9. Moderate persistent asthma without complication

## 2018-05-02 NOTE — ASSESSMENT & PLAN NOTE
He has had an elevated PSA up to a level of 10 over the last 15 years. This has been followed by a urologist and biopsies were done once in the past which were negative. His last PSA was approximately 8.

## 2018-05-02 NOTE — ASSESSMENT & PLAN NOTE
He uses Advair twice a day for his asthma. He has not had pulmonary function tests done recently. He is asymptomatic on the Advair.

## 2018-05-02 NOTE — ASSESSMENT & PLAN NOTE
He has had insomnia for his entire life. He has difficulty staying asleep and wakes up several hours and sleeping. Getting 5 hours of sleep a night is good for him. He has been using Ambien for this in the past. He has also been treated for restless leg syndrome. Currently he is using marijuana for restless legs which helps.

## 2018-05-02 NOTE — ASSESSMENT & PLAN NOTE
History of hemorrhoids that occasionally bleed. While traveling a few weeks ago he had a hard stool and had been bleeding since. He had a second hard stool about a week ago with recurrence of the bleeding. Over the last few days this has been improving and he notes only a small amount of pinkish fluid on the toilet paper after wiping. He is due for colonoscopy and has a referral already.

## 2018-05-08 ENCOUNTER — TELEPHONE (OUTPATIENT)
Dept: MEDICAL GROUP | Facility: PHYSICIAN GROUP | Age: 73
End: 2018-05-08

## 2018-05-08 NOTE — TELEPHONE ENCOUNTER
Received a fax from [facility] regarding a PAR needed due to the pt requesting 6 tabs, instead of the 4. Are you wanting to continue with this for 6 tabs or keep it for 4?

## 2018-05-10 NOTE — TELEPHONE ENCOUNTER
Six tabs a month is reasonable if that is what he would like.  We can attempt a prior auth, but it may not be approved.

## 2018-05-24 DIAGNOSIS — F51.01 PRIMARY INSOMNIA: ICD-10-CM

## 2018-05-24 NOTE — TELEPHONE ENCOUNTER
Pt states that he has been trying to get this med refilled for about a week now and talked to our office? However there are no encounters for this

## 2018-05-29 RX ORDER — ZOLPIDEM TARTRATE 10 MG/1
10 TABLET ORAL NIGHTLY PRN
Qty: 30 TAB | Refills: 6 | OUTPATIENT
Start: 2018-05-29 | End: 2018-06-28

## 2018-05-29 NOTE — TELEPHONE ENCOUNTER
His record was reviewed.  We had discussed at his last visit that this is a controlled substance.  He will need to be seen in person to discuss this as we had tried a different medication after his last visit.  Controlled substance prescriptions are given in person.  thanks

## 2018-05-30 ENCOUNTER — OFFICE VISIT (OUTPATIENT)
Dept: MEDICAL GROUP | Facility: PHYSICIAN GROUP | Age: 73
End: 2018-05-30
Payer: OTHER MISCELLANEOUS

## 2018-05-30 VITALS
SYSTOLIC BLOOD PRESSURE: 140 MMHG | BODY MASS INDEX: 24.84 KG/M2 | HEIGHT: 75 IN | HEART RATE: 90 BPM | OXYGEN SATURATION: 96 % | TEMPERATURE: 97.9 F | RESPIRATION RATE: 18 BRPM | WEIGHT: 199.8 LBS | DIASTOLIC BLOOD PRESSURE: 70 MMHG

## 2018-05-30 DIAGNOSIS — F51.01 PRIMARY INSOMNIA: ICD-10-CM

## 2018-05-30 DIAGNOSIS — E11.9 DIABETES MELLITUS TYPE 2, DIET-CONTROLLED (HCC): ICD-10-CM

## 2018-05-30 DIAGNOSIS — H91.13 PRESBYCUSIS OF BOTH EARS: ICD-10-CM

## 2018-05-30 PROCEDURE — 99214 OFFICE O/P EST MOD 30 MIN: CPT | Performed by: FAMILY MEDICINE

## 2018-05-30 RX ORDER — ZOLPIDEM TARTRATE 10 MG/1
10 TABLET ORAL NIGHTLY PRN
Qty: 20 TAB | Refills: 0 | Status: SHIPPED | OUTPATIENT
Start: 2018-05-30 | End: 2018-05-30 | Stop reason: SDUPTHER

## 2018-05-30 RX ORDER — ZOLPIDEM TARTRATE 10 MG/1
10 TABLET ORAL NIGHTLY PRN
Qty: 20 TAB | Refills: 0 | Status: SHIPPED | OUTPATIENT
Start: 2018-07-29 | End: 2018-07-10 | Stop reason: SDUPTHER

## 2018-05-30 RX ORDER — ZOLPIDEM TARTRATE 10 MG/1
10 TABLET ORAL NIGHTLY PRN
Qty: 20 TAB | Refills: 0 | Status: SHIPPED | OUTPATIENT
Start: 2018-06-29 | End: 2018-05-30 | Stop reason: SDUPTHER

## 2018-05-31 NOTE — PROGRESS NOTES
CC:insomnia    HISTORY OF PRESENT ILLNESS: Patient is a 73 y.o. male established patient who presents today to discuss the following.    Health Maintenance: updated    Insomnia  He continues to manage insomnia.  He tried 50 mg of trazodone but woke up after 2 hours.  He has not tried a higher dose.  Ambien, 10 mg has worked in the past.  He uses it up to 4 times a week, especially when travelling.  He would like to continue with this.    Diabetes mellitus type 2, diet-controlled ([facility])  This has been diet controlled, His last HgA1c was 6.8 more than 6 months ago.  He had an eye exam recently.      Presbycusis of both ears  He now has hearing aids and is happy with them.  He is able to hear better in conversation.      Patient Active Problem List    Diagnosis Date Noted   • Hematochezia 04/30/2018   • Presbycusis of both ears 04/30/2018   • Diabetes mellitus type 2, diet-controlled (HCC) 04/30/2018   • Gastroesophageal reflux disease without esophagitis 10/26/2017   • Moderate persistent asthma without complication 10/26/2017   • Cervical spine degeneration 10/26/2017   • Other hyperlipidemia 10/26/2017   • Elevated PSA 06/30/2016   • Insomnia 06/30/2016   • Restless leg syndrome 06/30/2016   • Right carotid bruit 06/30/2016   • Fatty liver 06/14/2016      Allergies:Sulfa drugs    Current Outpatient Prescriptions   Medication Sig Dispense Refill   • [START ON 7/29/2018] zolpidem (AMBIEN) 10 MG Tab Take 1 Tab by mouth at bedtime as needed for Sleep for up to 30 days. 20 Tab 0   • tadalafil (CIALIS) 20 MG tablet Take 1 Tab by mouth as needed for Erectile Dysfunction. 4 Tab 5   • fluticasone-salmeterol (ADVAIR DISKUS) 100-50 MCG/DOSE AEROSOL POWDER, BREATH ACTIVATED Inhale 1 Puff by mouth 2 times a day. 1 Inhaler 2   • gabapentin (NEURONTIN) 100 MG Cap Take 1 Cap by mouth 3 times a day. 90 Cap 3   • alfuzosin (UROXATRAL) 10 MG SR tablet Take 10 mg by mouth.     • vitamin D (CHOLECALCIFEROL) 1000 UNIT Tab Take 1,000  Units by mouth every day.     • fluticasone (FLONASE) 50 MCG/ACT nasal spray Spray 1 Spray in nose every day.     • multivitamin (THERAGRAN) Tab Take 1 Tab by mouth every day.     • Red Yeast Rice Extract (RED YEAST RICE PO) Take  by mouth.     • Omega-3 Fatty Acids (FISH OIL) 1000 MG Cap capsule Take 1,000 mg by mouth 3 times a day, with meals.     • montelukast (SINGULAIR) 10 MG Tab Take 1 Tab by mouth every day. 30 Tab 6   • omeprazole (PRILOSEC) 20 MG CPDR Take 20 mg by mouth every day.       No current facility-administered medications for this visit.        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     - HEENT: Negative for headaches, vision changes, hearing changes, ear pain, ear discharge, rhinorrhea, sinus congestion, sore throat, and neck pain.      - Respiratory: Negative for cough, sputum production, chest congestion, dyspnea, wheezing, and crackles.      - Cardiovascular: Negative for chest pain, palpitations, orthopnea, and bilateral lower extremity edema.     - Gastrointestinal: Negative for heartburn, nausea, vomiting, abdominal pain, hematochezia, melena, diarrhea, constipation, and greasy/foul-smelling stools.     - Genitourinary: Negative for dysuria, polyuria, hematuria, pyuria, urinary urgency, and urinary incontinence.    - Musculoskeletal: Negative for myalgias, back pain, and joint pain.     - Skin: Negative for rash, itching, cyanotic skin color  "change.     - Neurological: Negative for dizziness, tingling, tremors, focal sensory deficit, focal weakness and headaches.     - Endo/Heme/Allergies: Does not bruise/bleed easily.     - Psychiatric/Behavioral: Negative for depression, suicidal/homicidal ideation and memory loss.          Exam:    Blood pressure 140/70, pulse 90, temperature 36.6 °C (97.9 °F), resp. rate 18, height 1.905 m (6' 3\"), weight 90.6 kg (199 lb 12.8 oz), SpO2 96 %. Body mass index is 24.97 kg/m².    General:  Well nourished, well developed male in NAD  Head is grossly normal.  Neck: Supple without JVD or bruit. Thyroid is not enlarged.  Pulmonary: Clear to ausculation and percussion.  Normal effort. No rales, ronchi, or wheezing.  Cardiovascular: Regular rate and rhythm without murmur. Carotid and radial pulses are intact and equal bilaterally.  Extremities: no clubbing, cyanosis, or edema.  Monofilament testing with a 10 gram force: sensation intact: intact bilaterally  Visual Inspection: Feet without maceration, ulcers, fissures.  Pedal pulses: intact bilaterally    Please note that this dictation was created using voice recognition software. I have made every reasonable attempt to correct obvious errors, but I expect that there are errors of grammar and possibly content that I did not discover before finalizing the note.      Assessment/Plan:  1. Primary insomnia  He has insomnia that has not responded to trazodone.  Medications other than Ambien have not been effective for him.  As he is still working and traveling a sleep aid is reasonable.  The risks and appears warning of Ambien use were discussed and he is advised not to mix it with alcohol.  Office policy regarding controlled substance prescribing was reviewed including annual drug screen, controlled substance agreement and in person visits for refills.  He is advised that as his work responsibilities decrease or when he retires it would be ideal to limit or stop use of Ambien.  - " zolpidem (AMBIEN) 10 MG Tab; Take 1 Tab by mouth at bedtime as needed for Sleep for up to 30 days.  Dispense: 20 Tab; Refill: 0  - [facility] SCREEN; Future  - Controlled Substance Treatment Agreement    2. Diabetes mellitus type 2, diet-controlled (HCC)  His diabetes is well controlled with diet, he is due for repeat hemoglobin A1c to confirm control.  His eye exam was done recently, we will request records.  - HEMOGLOBIN A1C; Future  - MICROALBUMIN CREAT RATIO URINE; Future  - Diabetic Monofilament Lower Extremity Exam    3. Presbycusis of both ears  This is treated with hearing aids with good success.  Continue current management.

## 2018-05-31 NOTE — ASSESSMENT & PLAN NOTE
He continues to manage insomnia.  He tried 50 mg of trazodone but woke up after 2 hours.  He has not tried a higher dose.  Ambien, 10 mg has worked in the past.  He uses it up to 4 times a week, especially when travelling.  He would like to continue with this.

## 2018-05-31 NOTE — ASSESSMENT & PLAN NOTE
This has been diet controlled, His last HgA1c was 6.8 more than 6 months ago.  He had an eye exam recently.

## 2018-07-09 ENCOUNTER — HOSPITAL ENCOUNTER (OUTPATIENT)
Dept: LAB | Facility: MEDICAL CENTER | Age: 73
End: 2018-07-09
Attending: FAMILY MEDICINE
Payer: OTHER MISCELLANEOUS

## 2018-07-09 DIAGNOSIS — E78.49 OTHER HYPERLIPIDEMIA: ICD-10-CM

## 2018-07-09 DIAGNOSIS — E11.9 DIABETES MELLITUS TYPE 2, DIET-CONTROLLED (HCC): ICD-10-CM

## 2018-07-09 LAB
CHOLEST SERPL-MCNC: 187 MG/DL (ref 100–199)
EST. AVERAGE GLUCOSE BLD GHB EST-MCNC: 160 MG/DL
HBA1C MFR BLD: 7.2 % (ref 0–5.6)
HDLC SERPL-MCNC: 33 MG/DL
LDLC SERPL CALC-MCNC: 116 MG/DL
TRIGL SERPL-MCNC: 189 MG/DL (ref 0–149)

## 2018-07-09 PROCEDURE — 36415 COLL VENOUS BLD VENIPUNCTURE: CPT

## 2018-07-09 PROCEDURE — 83036 HEMOGLOBIN GLYCOSYLATED A1C: CPT

## 2018-07-09 PROCEDURE — 80061 LIPID PANEL: CPT

## 2018-07-10 ENCOUNTER — OFFICE VISIT (OUTPATIENT)
Dept: MEDICAL GROUP | Facility: MEDICAL CENTER | Age: 73
End: 2018-07-10
Payer: OTHER MISCELLANEOUS

## 2018-07-10 VITALS
SYSTOLIC BLOOD PRESSURE: 132 MMHG | DIASTOLIC BLOOD PRESSURE: 70 MMHG | WEIGHT: 197.8 LBS | BODY MASS INDEX: 24.59 KG/M2 | HEART RATE: 61 BPM | RESPIRATION RATE: 16 BRPM | OXYGEN SATURATION: 96 % | HEIGHT: 75 IN

## 2018-07-10 DIAGNOSIS — K21.9 GASTROESOPHAGEAL REFLUX DISEASE WITHOUT ESOPHAGITIS: ICD-10-CM

## 2018-07-10 DIAGNOSIS — N52.9 ERECTILE DYSFUNCTION, UNSPECIFIED ERECTILE DYSFUNCTION TYPE: ICD-10-CM

## 2018-07-10 DIAGNOSIS — R73.03 PREDIABETES: ICD-10-CM

## 2018-07-10 DIAGNOSIS — G25.81 RESTLESS LEG SYNDROME: ICD-10-CM

## 2018-07-10 DIAGNOSIS — E78.49 OTHER HYPERLIPIDEMIA: ICD-10-CM

## 2018-07-10 DIAGNOSIS — J45.40 MODERATE PERSISTENT ASTHMA WITHOUT COMPLICATION: ICD-10-CM

## 2018-07-10 DIAGNOSIS — E11.9 DIABETES MELLITUS TYPE 2, DIET-CONTROLLED (HCC): ICD-10-CM

## 2018-07-10 DIAGNOSIS — Z87.09 HISTORY OF ASTHMA: ICD-10-CM

## 2018-07-10 DIAGNOSIS — F51.01 PRIMARY INSOMNIA: ICD-10-CM

## 2018-07-10 PROCEDURE — 99214 OFFICE O/P EST MOD 30 MIN: CPT | Performed by: PHYSICIAN ASSISTANT

## 2018-07-10 RX ORDER — ZOLPIDEM TARTRATE 10 MG/1
10 TABLET ORAL NIGHTLY PRN
Qty: 20 TAB | Refills: 0 | Status: CANCELLED | OUTPATIENT
Start: 2018-07-29 | End: 2018-08-28

## 2018-07-10 RX ORDER — LANSOPRAZOLE 30 MG/1
30 CAPSULE, DELAYED RELEASE ORAL DAILY
Qty: 90 CAP | Refills: 3 | Status: SHIPPED | OUTPATIENT
Start: 2018-07-10 | End: 2019-05-28 | Stop reason: SDUPTHER

## 2018-07-10 RX ORDER — LANSOPRAZOLE 30 MG/1
30 CAPSULE, DELAYED RELEASE ORAL DAILY
COMMUNITY
End: 2018-07-10 | Stop reason: SDUPTHER

## 2018-07-10 RX ORDER — MONTELUKAST SODIUM 10 MG/1
10 TABLET ORAL DAILY
Qty: 90 TAB | Refills: 3 | Status: SHIPPED | OUTPATIENT
Start: 2018-07-10 | End: 2018-10-09 | Stop reason: SDUPTHER

## 2018-07-10 RX ORDER — ZOLPIDEM TARTRATE 10 MG/1
10 TABLET ORAL NIGHTLY PRN
Qty: 20 TAB | Refills: 6 | Status: SHIPPED
Start: 2018-07-29 | End: 2018-08-28

## 2018-07-10 RX ORDER — TADALAFIL 20 MG/1
20 TABLET ORAL PRN
Qty: 4 TAB | Refills: 5 | Status: SHIPPED | OUTPATIENT
Start: 2018-07-10 | End: 2018-10-17 | Stop reason: SDUPTHER

## 2018-07-10 NOTE — ASSESSMENT & PLAN NOTE
Hasn't been on prescription medication prior. Most recent labs show elevation. Would like to go back on red yeast and exercise then recheck. Smoked 25 years, stopped 2005. No HTN. Pre-diabetes. No heart attack, stroke, carotid artery issues. No fam hx of early cardiac death. CV 10 year risk 25%. Consider calcium scoring CT scan. Doesn't take aspirin because of GERD. Not on BP med.

## 2018-07-10 NOTE — PATIENT INSTRUCTIONS
Cholesterol  Cholesterol is a white, waxy, fat-like substance needed by your body in small amounts. The liver makes all the cholesterol you need. Cholesterol is carried from the liver by the blood through the blood vessels. Deposits of cholesterol (plaque) may build up on blood vessel walls. These make the arteries narrower and stiffer. Cholesterol plaques increase the risk for heart attack and stroke.   You cannot feel your cholesterol level even if it is very high. The only way to know it is high is with a blood test. Once you know your cholesterol levels, you should keep a record of the test results. Work with your health care provider to keep your levels in the desired range.   WHAT DO THE RESULTS MEAN?  · Total cholesterol is a rough measure of all the cholesterol in your blood.    · LDL is the so-called bad cholesterol. This is the type that deposits cholesterol in the walls of the arteries. You want this level to be low.    · HDL is the good cholesterol because it cleans the arteries and carries the LDL away. You want this level to be high.  · Triglycerides are fat that the body can either burn for energy or store. High levels are closely linked to heart disease.    WHAT ARE THE DESIRED LEVELS OF CHOLESTEROL?  · Total cholesterol below 200.    · LDL below 100 for people at risk, below 70 for those at very high risk.    · HDL above 50 is good, above 60 is best.    · Triglycerides below 150.    HOW CAN I LOWER MY CHOLESTEROL?  · Diet. Follow your diet programs as directed by your health care provider.    ¨ Choose fish or white meat chicken and turkey, roasted or baked. Limit fatty cuts of red meat, fried foods, and processed meats, such as sausage and lunch meats.    ¨ Eat lots of fresh fruits and vegetables.  ¨ Choose whole grains, beans, pasta, potatoes, and cereals.    ¨ Use only small amounts of olive, corn, or canola oils.    ¨ Avoid butter, mayonnaise, shortening, or palm kernel oils.  ¨ Avoid foods with  trans fats.    ¨ Drink skim or nonfat milk and eat low-fat or nonfat yogurt and cheeses. Avoid whole milk, cream, ice cream, egg yolks, and full-fat cheeses.    ¨ Healthy desserts include zulma food cake, vidhi snaps, animal crackers, hard candy, popsicles, and low-fat or nonfat frozen yogurt. Avoid pastries, cakes, pies, and cookies.    · Exercise. Follow your exercise programs as directed by your health care provider.    ¨ A regular program helps decrease LDL and raise HDL.    ¨ A regular program helps with weight control.    ¨ Do things that increase your activity level like gardening, walking, or taking the stairs. Ask your health care provider about how you can be more active in your daily life.    · Medicine. Take medicine only as directed by your health care provider.    ¨ Medicine may be prescribed by your health care provider to help lower cholesterol and decrease the risk for heart disease.    ¨ If you have several risk factors, you may need medicine even if your levels are normal.     This information is not intended to replace advice given to you by your health care provider. Make sure you discuss any questions you have with your health care provider.     Document Released: 09/12/2002 Document Revised: 01/08/2016 Document Reviewed: 10/01/2014  [employer] Patient Education ©2016 [employer]  Diabetes Mellitus and Food  It is important for you to manage your blood sugar (glucose) level. Your blood glucose level can be greatly affected by what you eat. Eating healthier foods in the appropriate amounts throughout the day at about the same time each day will help you control your blood glucose level. It can also help slow or prevent worsening of your diabetes mellitus. Healthy eating may even help you improve the level of your blood pressure and reach or maintain a healthy weight.  General recommendations for healthful eating and cooking habits include:  · Eating meals and snacks regularly. Avoid  going long periods of time without eating to lose weight.  · Eating a diet that consists mainly of plant-based foods, such as fruits, vegetables, nuts, legumes, and whole grains.  · Using low-heat cooking methods, such as baking, instead of high-heat cooking methods, such as deep frying.  Work with your dietitian to make sure you understand how to use the Nutrition Facts information on food labels.  How can food affect me?  Carbohydrates   Carbohydrates affect your blood glucose level more than any other type of food. Your dietitian will help you determine how many carbohydrates to eat at each meal and teach you how to count carbohydrates. Counting carbohydrates is important to keep your blood glucose at a healthy level, especially if you are using insulin or taking certain medicines for diabetes mellitus.  Alcohol   Alcohol can cause sudden decreases in blood glucose (hypoglycemia), especially if you use insulin or take certain medicines for diabetes mellitus. Hypoglycemia can be a life-threatening condition. Symptoms of hypoglycemia (sleepiness, dizziness, and disorientation) are similar to symptoms of having too much alcohol.  If your health care provider has given you approval to drink alcohol, do so in moderation and use the following guidelines:  · Women should not have more than one drink per day, and men should not have more than two drinks per day. One drink is equal to:  ¨ 12 oz of beer.  ¨ 5 oz of wine.  ¨ 1½ oz of hard liquor.  · Do not drink on an empty stomach.  · Keep yourself hydrated. Have water, diet soda, or unsweetened iced tea.  · Regular soda, juice, and other mixers might contain a lot of carbohydrates and should be counted.  What foods are not recommended?  As you make food choices, it is important to remember that all foods are not the same. Some foods have fewer nutrients per serving than other foods, even though they might have the same number of calories or carbohydrates. It is difficult  to get your body what it needs when you eat foods with fewer nutrients. Examples of foods that you should avoid that are high in calories and carbohydrates but low in nutrients include:  · Trans fats (most processed foods list trans fats on the Nutrition Facts label).  · Regular soda.  · Juice.  · Candy.  · Sweets, such as cake, pie, doughnuts, and cookies.  · Fried foods.  What foods can I eat?  Eat nutrient-rich foods, which will nourish your body and keep you healthy. The food you should eat also will depend on several factors, including:  · The calories you need.  · The medicines you take.  · Your weight.  · Your blood glucose level.  · Your blood pressure level.  · Your cholesterol level.  You should eat a variety of foods, including:  · Protein.  ¨ Lean cuts of meat.  ¨ Proteins low in saturated fats, such as fish, egg whites, and beans. Avoid processed meats.  · Fruits and vegetables.  ¨ Fruits and vegetables that may help control blood glucose levels, such as apples, mangoes, and yams.  · Dairy products.  ¨ Choose fat-free or low-fat dairy products, such as milk, yogurt, and cheese.  · Grains, bread, pasta, and rice.  ¨ Choose whole grain products, such as multigrain bread, whole oats, and brown rice. These foods may help control blood pressure.  · Fats.  ¨ Foods containing healthful fats, such as nuts, avocado, olive oil, canola oil, and fish.  Does everyone with diabetes mellitus have the same meal plan?  Because every person with diabetes mellitus is different, there is not one meal plan that works for everyone. It is very important that you meet with a dietitian who will help you create a meal plan that is just right for you.  This information is not intended to replace advice given to you by your health care provider. Make sure you discuss any questions you have with your health care provider.  Document Released: 09/14/2006 Document Revised: 05/25/2017 Document Reviewed: 11/14/2014  [employer]  Patient Education © 2017 [employer]

## 2018-07-11 PROBLEM — R73.03 PREDIABETES: Status: RESOLVED | Noted: 2018-07-10 | Resolved: 2018-07-11

## 2018-07-11 NOTE — PROGRESS NOTES
Subjective:   Johnny Iraheta is a 73 y.o. male here today for follow up on chronic conditions    Other hyperlipidemia  Hasn't been on prescription medication prior. Most recent labs show elevation. Would like to go back on red yeast and exercise then recheck. Smoked 25 years, stopped 2005. No HTN. Pre-diabetes. No heart attack, stroke, carotid artery issues. No fam hx of early cardiac death. CV 10 year risk 25%. Consider calcium scoring CT scan. Doesn't take aspirin because of GERD. Not on BP med.    Diabetes mellitus type 2, diet-controlled (HCC)  Most recent HgA1C at 7.2. Prefers to not be on medication. This is a new dx. Will work on diet and exercise and repeat 3 months. Discussed diabetes in relation to CV risk.    Gastroesophageal reflux disease without esophagitis  Chronic, stable on current prevacid    Insomnia  Chronic, stable on current prn ambien. Would like to continue    Moderate persistent asthma without complication  Chronic, stable on current meds    Restless leg syndrome  Chronic, stable on current gabapentin       Current medicines (including changes today)  Current Outpatient Prescriptions   Medication Sig Dispense Refill   • lansoprazole (PREVACID) 30 MG CAPSULE DELAYED RELEASE Take 1 Cap by mouth every day. 90 Cap 3   • fluticasone-salmeterol (ADVAIR DISKUS) 100-50 MCG/DOSE AEROSOL POWDER, BREATH ACTIVATED Inhale 1 Puff by mouth 2 times a day. 1 Inhaler 6   • montelukast (SINGULAIR) 10 MG Tab Take 1 Tab by mouth every day. 90 Tab 3   • tadalafil (CIALIS) 20 MG tablet Take 1 Tab by mouth as needed for Erectile Dysfunction. 4 Tab 5   • [START ON 7/29/2018] zolpidem (AMBIEN) 10 MG Tab Take 1 Tab by mouth at bedtime as needed for Sleep for up to 30 days. 20 Tab 6   • gabapentin (NEURONTIN) 100 MG Cap Take 1 Cap by mouth 3 times a day. 90 Cap 3   • alfuzosin (UROXATRAL) 10 MG SR tablet Take 10 mg by mouth.     • vitamin D (CHOLECALCIFEROL) 1000 UNIT Tab Take 1,000 Units by mouth every day.     •  "fluticasone (FLONASE) 50 MCG/ACT nasal spray Spray 1 Spray in nose every day.     • multivitamin (THERAGRAN) Tab Take 1 Tab by mouth every day.     • Red Yeast Rice Extract (RED YEAST RICE PO) Take  by mouth.     • Omega-3 Fatty Acids (FISH OIL) 1000 MG Cap capsule Take 1,000 mg by mouth 3 times a day, with meals.       No current facility-administered medications for this visit.      He  has a past medical history of Allergic rhinitis; Asthma; Asymptomatic stenosis of right carotid artery; BPH (benign prostatic hyperplasia); Erectile dysfunction; Fatty liver; GERD (gastroesophageal reflux disease); Insomnia; and Restless leg syndrome.    ROS   No fever/chills. No weight change. No headache/dizziness. No focal weakness. No sore throat, nasal congestion, ear pain. No chest pain, no shortness of breath, difficulty breathing. No n/v/d/c or abdominal pain. No urinary complaint. No rash or skin lesion. No joint pain or swelling.       Objective:     Blood pressure 132/70, pulse 61, resp. rate 16, height 1.905 m (6' 3\"), weight 89.7 kg (197 lb 12.8 oz), SpO2 96 %. Body mass index is 24.72 kg/m².   Physical Exam:  Constitutional: WDWN, NAD  Skin: Warm, dry, good turgor, no rashes in visible areas.  Eye: Equal, round and reactive, conjunctiva clear, lids normal.  ENMT: Lips without lesions, good dentition, oropharynx clear.  Neck: Trachea midline, no masses, no thyromegaly. No cervical or supraclavicular lymphadenopathy  Respiratory: Unlabored respiratory effort, lungs clear to auscultation, no wheezes, no ronchi.  Cardiovascular: Normal S1, S2, no murmur, no leg edema.  Psych: Alert and oriented x3, normal affect and mood.        Assessment and Plan:   The following treatment plan was discussed    1. Primary insomnia    - zolpidem (AMBIEN) 10 MG Tab; Take 1 Tab by mouth at bedtime as needed for Sleep for up to 30 days.  Dispense: 20 Tab; Refill: 6    2. History of asthma    - fluticasone-salmeterol (ADVAIR DISKUS) 100-50 " MCG/DOSE AEROSOL POWDER, BREATH ACTIVATED; Inhale 1 Puff by mouth 2 times a day.  Dispense: 1 Inhaler; Refill: 6  - montelukast (SINGULAIR) 10 MG Tab; Take 1 Tab by mouth every day.  Dispense: 90 Tab; Refill: 3    3. Erectile dysfunction, unspecified erectile dysfunction type    - tadalafil (CIALIS) 20 MG tablet; Take 1 Tab by mouth as needed for Erectile Dysfunction.  Dispense: 4 Tab; Refill: 5    4. Other hyperlipidemia  Discussed diet. Consider statin  - LIPID PROFILE; Future    6. Moderate persistent asthma without complication  Cont current    7. Diabetes mellitus type 2, diet-controlled (HCC)  Diet and exercise discussed, repeat HgA1C in 3 months - consider metformin, statin and ace inhibitor    8. Gastroesophageal reflux disease without esophagitis  Cont current    9. Restless leg syndrome  Cont current      Followup: Return in about 3 months (around 10/10/2018).

## 2018-07-11 NOTE — ASSESSMENT & PLAN NOTE
Most recent HgA1C at 7.2. Prefers to not be on medication. This is a new dx. Will work on diet and exercise and repeat 3 months. Discussed diabetes in relation to CV risk.

## 2018-09-20 RX ORDER — GABAPENTIN 100 MG/1
100 CAPSULE ORAL 3 TIMES DAILY
Qty: 90 CAP | Refills: 6 | Status: SHIPPED | OUTPATIENT
Start: 2018-09-20 | End: 2018-10-09 | Stop reason: SDUPTHER

## 2018-09-21 DIAGNOSIS — M47.22 OSTEOARTHRITIS OF SPINE WITH RADICULOPATHY, CERVICAL REGION: ICD-10-CM

## 2018-09-21 NOTE — PROGRESS NOTES
1. Caller Name: Johnny Page                                           Call Back Number: [phone] (home)         Patient approves a detailed voicemail message: N\A    2. SPECIFIC Action To Be Taken: Referral pending, please sign.    3. Diagnosis/Clinical Reason for Request: Neck pain    4. Specialty & Provider Name/Lab/Imaging Location: Physical Therapy    5. Is appointment scheduled for requested order/referral: no    Patient was informed they will receive a return phone call from the office ONLY if there are any questions before processing their request. Advised to call back if they haven't received a call from the referral department in 5 days.

## 2018-10-09 ENCOUNTER — OFFICE VISIT (OUTPATIENT)
Dept: MEDICAL GROUP | Facility: MEDICAL CENTER | Age: 73
End: 2018-10-09
Payer: OTHER MISCELLANEOUS

## 2018-10-09 VITALS
WEIGHT: 195.8 LBS | BODY MASS INDEX: 24.34 KG/M2 | OXYGEN SATURATION: 95 % | HEART RATE: 89 BPM | DIASTOLIC BLOOD PRESSURE: 70 MMHG | SYSTOLIC BLOOD PRESSURE: 128 MMHG | HEIGHT: 75 IN

## 2018-10-09 DIAGNOSIS — Z23 NEED FOR PNEUMOCOCCAL VACCINATION: ICD-10-CM

## 2018-10-09 DIAGNOSIS — N40.0 BENIGN PROSTATIC HYPERPLASIA, UNSPECIFIED WHETHER LOWER URINARY TRACT SYMPTOMS PRESENT: ICD-10-CM

## 2018-10-09 DIAGNOSIS — F51.01 PRIMARY INSOMNIA: ICD-10-CM

## 2018-10-09 DIAGNOSIS — R97.20 ELEVATED PSA: ICD-10-CM

## 2018-10-09 DIAGNOSIS — G25.81 RESTLESS LEG SYNDROME: ICD-10-CM

## 2018-10-09 DIAGNOSIS — Z87.09 HISTORY OF ASTHMA: ICD-10-CM

## 2018-10-09 DIAGNOSIS — E78.49 OTHER HYPERLIPIDEMIA: ICD-10-CM

## 2018-10-09 DIAGNOSIS — Z23 NEED FOR INFLUENZA VACCINATION: ICD-10-CM

## 2018-10-09 DIAGNOSIS — J45.40 MODERATE PERSISTENT ASTHMA WITHOUT COMPLICATION: ICD-10-CM

## 2018-10-09 DIAGNOSIS — E11.9 DIABETES MELLITUS TYPE 2, DIET-CONTROLLED (HCC): ICD-10-CM

## 2018-10-09 PROCEDURE — 90472 IMMUNIZATION ADMIN EACH ADD: CPT | Performed by: PHYSICIAN ASSISTANT

## 2018-10-09 PROCEDURE — 99214 OFFICE O/P EST MOD 30 MIN: CPT | Mod: 25 | Performed by: PHYSICIAN ASSISTANT

## 2018-10-09 PROCEDURE — 90662 IIV NO PRSV INCREASED AG IM: CPT | Performed by: PHYSICIAN ASSISTANT

## 2018-10-09 PROCEDURE — 90670 PCV13 VACCINE IM: CPT | Performed by: PHYSICIAN ASSISTANT

## 2018-10-09 PROCEDURE — 90471 IMMUNIZATION ADMIN: CPT | Performed by: PHYSICIAN ASSISTANT

## 2018-10-09 RX ORDER — MONTELUKAST SODIUM 10 MG/1
10 TABLET ORAL DAILY
Qty: 90 TAB | Refills: 3 | Status: SHIPPED | OUTPATIENT
Start: 2018-10-09 | End: 2019-11-17 | Stop reason: SDUPTHER

## 2018-10-09 RX ORDER — GABAPENTIN 100 MG/1
100 CAPSULE ORAL 3 TIMES DAILY
Qty: 90 CAP | Refills: 6 | Status: SHIPPED
Start: 2018-10-09 | End: 2020-01-30 | Stop reason: SDUPTHER

## 2018-10-09 RX ORDER — ALFUZOSIN HYDROCHLORIDE 10 MG/1
10 TABLET, EXTENDED RELEASE ORAL DAILY
Qty: 90 TAB | Refills: 3 | Status: SHIPPED | OUTPATIENT
Start: 2018-10-09 | End: 2020-04-08 | Stop reason: SDUPTHER

## 2018-10-09 ASSESSMENT — PATIENT HEALTH QUESTIONNAIRE - PHQ9: CLINICAL INTERPRETATION OF PHQ2 SCORE: 0

## 2018-10-17 DIAGNOSIS — N52.9 ERECTILE DYSFUNCTION, UNSPECIFIED ERECTILE DYSFUNCTION TYPE: ICD-10-CM

## 2018-10-17 RX ORDER — TADALAFIL 20 MG/1
20 TABLET ORAL PRN
Qty: 4 TAB | Refills: 5 | Status: SHIPPED | OUTPATIENT
Start: 2018-10-17 | End: 2019-05-28 | Stop reason: SDUPTHER

## 2018-10-17 NOTE — TELEPHONE ENCOUNTER
Was the patient seen in the last year in this department? Yes    Does patient have an active prescription for medications requested? No     Received Request Via: Patient

## 2018-10-24 ENCOUNTER — PATIENT MESSAGE (OUTPATIENT)
Dept: MEDICAL GROUP | Facility: MEDICAL CENTER | Age: 73
End: 2018-10-24

## 2018-10-24 DIAGNOSIS — L71.9 ROSACEA: ICD-10-CM

## 2018-10-25 RX ORDER — TETRACYCLINE HYDROCHLORIDE 250 MG/1
250 CAPSULE ORAL 2 TIMES DAILY
Qty: 14 CAP | Refills: 1 | Status: SHIPPED | OUTPATIENT
Start: 2018-10-25 | End: 2018-11-01

## 2018-12-14 ENCOUNTER — HOSPITAL ENCOUNTER (OUTPATIENT)
Dept: LAB | Facility: MEDICAL CENTER | Age: 73
End: 2018-12-14
Attending: PHYSICIAN ASSISTANT
Payer: OTHER MISCELLANEOUS

## 2018-12-14 DIAGNOSIS — E78.49 OTHER HYPERLIPIDEMIA: ICD-10-CM

## 2018-12-14 DIAGNOSIS — R73.03 PREDIABETES: ICD-10-CM

## 2018-12-14 PROCEDURE — 36415 COLL VENOUS BLD VENIPUNCTURE: CPT

## 2018-12-14 PROCEDURE — 83036 HEMOGLOBIN GLYCOSYLATED A1C: CPT

## 2018-12-14 PROCEDURE — 80061 LIPID PANEL: CPT

## 2018-12-15 LAB
CHOLEST SERPL-MCNC: 192 MG/DL (ref 100–199)
EST. AVERAGE GLUCOSE BLD GHB EST-MCNC: 154 MG/DL
FASTING STATUS PATIENT QL REPORTED: NORMAL
HBA1C MFR BLD: 7 % (ref 0–5.6)
HDLC SERPL-MCNC: 32 MG/DL
LDLC SERPL CALC-MCNC: 124 MG/DL
TRIGL SERPL-MCNC: 181 MG/DL (ref 0–149)

## 2018-12-20 DIAGNOSIS — Z87.09 HISTORY OF ASTHMA: ICD-10-CM

## 2018-12-20 NOTE — TELEPHONE ENCOUNTER
Was the patient seen in the last year in this department? Yes    Does patient have an active prescription for medications requested? Yes    Received Request Via: Pharmacy     Last Visit: 10/9/18  Last Lab: 12/14/18

## 2018-12-24 ENCOUNTER — TELEPHONE (OUTPATIENT)
Dept: MEDICAL GROUP | Facility: MEDICAL CENTER | Age: 73
End: 2018-12-24

## 2018-12-24 NOTE — TELEPHONE ENCOUNTER
----- Message from Leisa Fink P.A.-C. sent at 12/24/2018 10:09 AM PST -----  Please verify patient receives results. Thanks! Leisa Fink PA-C

## 2018-12-24 NOTE — TELEPHONE ENCOUNTER
1. Caller Name: Johnny Page                                         Call Back Number: [phone] (home)       Patient approves a detailed voicemail message: N\A    Gave results to patient via [employer]

## 2019-01-19 DIAGNOSIS — F51.01 PRIMARY INSOMNIA: ICD-10-CM

## 2019-01-21 RX ORDER — ZOLPIDEM TARTRATE 10 MG/1
TABLET ORAL
Qty: 20 TAB | Refills: 2 | Status: SHIPPED
Start: 2019-01-21 | End: 2019-06-04 | Stop reason: SDUPTHER

## 2019-01-21 NOTE — TELEPHONE ENCOUNTER
Cant pull your   Was the patient seen in the last year in this department? Yes    Does patient have an active prescription for medications requested? No     Received Request Via: Pharmacy

## 2019-03-15 ENCOUNTER — OFFICE VISIT (OUTPATIENT)
Dept: MEDICAL GROUP | Facility: MEDICAL CENTER | Age: 74
End: 2019-03-15
Payer: OTHER MISCELLANEOUS

## 2019-03-15 VITALS
WEIGHT: 195.8 LBS | RESPIRATION RATE: 16 BRPM | DIASTOLIC BLOOD PRESSURE: 76 MMHG | HEIGHT: 75 IN | SYSTOLIC BLOOD PRESSURE: 142 MMHG | OXYGEN SATURATION: 98 % | HEART RATE: 76 BPM | TEMPERATURE: 98.8 F | BODY MASS INDEX: 24.34 KG/M2

## 2019-03-15 DIAGNOSIS — J45.40 MODERATE PERSISTENT ASTHMA WITHOUT COMPLICATION: ICD-10-CM

## 2019-03-15 DIAGNOSIS — J40 BRONCHITIS: ICD-10-CM

## 2019-03-15 PROCEDURE — 99213 OFFICE O/P EST LOW 20 MIN: CPT | Performed by: PHYSICIAN ASSISTANT

## 2019-03-15 RX ORDER — AZITHROMYCIN 250 MG/1
TABLET, FILM COATED ORAL
Qty: 6 TAB | Refills: 0 | Status: SHIPPED | OUTPATIENT
Start: 2019-03-15 | End: 2019-06-04

## 2019-03-15 RX ORDER — ALBUTEROL SULFATE 90 UG/1
2 AEROSOL, METERED RESPIRATORY (INHALATION) EVERY 6 HOURS PRN
Qty: 8.5 G | Refills: 3 | Status: SHIPPED | OUTPATIENT
Start: 2019-03-15 | End: 2020-04-16 | Stop reason: SDUPTHER

## 2019-03-15 ASSESSMENT — PATIENT HEALTH QUESTIONNAIRE - PHQ9: CLINICAL INTERPRETATION OF PHQ2 SCORE: 0

## 2019-03-15 NOTE — ASSESSMENT & PLAN NOTE
Patient presents for f/u after bronchitis. Symptoms started while he was in [place] for the month of February. Seen in the urgent care there twice. 2 rounds of antibiotic and one course of prednisone. Most recent antibiotic was augmentin, doesn't remember the first. Feeling better. Cough is better. Able to exercise now. Still having some SOB with exertion and with cold air. Using advair although it causes hoarse voice. Using alb prn. No chest pain, dyspnea, chest tightness. No leg swelling. No fever/chills. Just wanted to check in and make sure he was ok.

## 2019-03-15 NOTE — PROGRESS NOTES
Subjective:   Johnny Iraheta is a 74 y.o. male here today for f/u on bronchitis    Bronchitis  Patient presents for f/u after bronchitis. Symptoms started while he was in [place] for the month of February. Seen in the urgent care there twice. 2 rounds of antibiotic and one course of prednisone. Most recent antibiotic was augmentin, doesn't remember the first. Feeling better. Cough is better. Able to exercise now. Still having some SOB with exertion and with cold air. Using advair although it causes hoarse voice. Using alb prn. No chest pain, dyspnea, chest tightness. No leg swelling. No fever/chills. Just wanted to check in and make sure he was ok.     Current medicines (including changes today)  Current Outpatient Prescriptions   Medication Sig Dispense Refill   • albuterol 108 (90 Base) MCG/ACT Aero Soln inhalation aerosol Inhale 2 Puffs by mouth every 6 hours as needed for Shortness of Breath. 8.5 g 3   • azithromycin (ZITHROMAX) 250 MG Tab Use MONICA as directed 6 Tab 0   • simvastatin (ZOCOR) 20 MG Tab Take 1 Tab by mouth every evening for 90 days. 90 Tab 1   • ADVAIR DISKUS 100-50 MCG/DOSE AEROSOL POWDER, BREATH ACTIVATED INHALE ONE PUFF BY MOUTH TWICE A DAY - RINSE MOUTH AFTER EACH USE 60 Inhaler 3   • tadalafil (CIALIS) 20 MG tablet Take 1 Tab by mouth as needed for Erectile Dysfunction. 4 Tab 5   • montelukast (SINGULAIR) 10 MG Tab Take 1 Tab by mouth every day. 90 Tab 3   • gabapentin (NEURONTIN) 100 MG Cap Take 1 Cap by mouth 3 times a day. 90 Cap 6   • alfuzosin (UROXATRAL) 10 MG SR tablet Take 1 Tab by mouth every day. 90 Tab 3   • lansoprazole (PREVACID) 30 MG CAPSULE DELAYED RELEASE Take 1 Cap by mouth every day. 90 Cap 3   • vitamin D (CHOLECALCIFEROL) 1000 UNIT Tab Take 1,000 Units by mouth every day.     • fluticasone (FLONASE) 50 MCG/ACT nasal spray Spray 1 Spray in nose every day.     • multivitamin (THERAGRAN) Tab Take 1 Tab by mouth every day.     • Red Yeast Rice Extract (RED YEAST RICE PO) Take  by  "mouth.     • Omega-3 Fatty Acids (FISH OIL) 1000 MG Cap capsule Take 1,000 mg by mouth 3 times a day, with meals.       No current facility-administered medications for this visit.      He  has a past medical history of Allergic rhinitis; Asthma; Asymptomatic stenosis of right carotid artery; BPH (benign prostatic hyperplasia); Erectile dysfunction; Fatty liver; GERD (gastroesophageal reflux disease); Insomnia; and Restless leg syndrome.    ROS   No fever/chills. No weight change. No headache/dizziness. No focal weakness. No sore throat, nasal congestion, ear pain. No n/v/d/c or abdominal pain. No urinary complaint. No rash or skin lesion. No joint pain or swelling.     Objective:     Blood pressure 142/76, pulse 76, temperature 37.1 °C (98.8 °F), temperature source Temporal, resp. rate 16, height 1.905 m (6' 3\"), weight 88.8 kg (195 lb 12.8 oz), SpO2 98 %. Body mass index is 24.47 kg/m².   Physical Exam:  Constitutional: WDWN, NAD  Skin: Warm, dry, good turgor, no rashes in visible areas.  Eye: Equal, round and reactive, conjunctiva clear, lids normal.  ENMT: Lips without lesions, good dentition, oropharynx clear.  Neck: Trachea midline, no masses, no thyromegaly. No cervical or supraclavicular lymphadenopathy  Respiratory: Unlabored respiratory effort, lungs clear to auscultation, no wheezes, no ronchi.  Cardiovascular: Normal S1, S2, no murmur, no leg edema.  Psych: Alert and oriented x3, normal affect and mood.        Assessment and Plan:   The following treatment plan was discussed    1. Bronchitis  Resolving, cont advair, alb prn, zpack for future use if out of town and symptoms of bronchitis occur  - albuterol 108 (90 Base) MCG/ACT Aero Soln inhalation aerosol; Inhale 2 Puffs by mouth every 6 hours as needed for Shortness of Breath.  Dispense: 8.5 g; Refill: 3  - azithromycin (ZITHROMAX) 250 MG Tab; Use MONICA as directed  Dispense: 6 Tab; Refill: 0    2. Moderate persistent asthma without complication    - " albuterol 108 (90 Base) MCG/ACT Aero Soln inhalation aerosol; Inhale 2 Puffs by mouth every 6 hours as needed for Shortness of Breath.  Dispense: 8.5 g; Refill: 3  - azithromycin (ZITHROMAX) 250 MG Tab; Use MONICA as directed  Dispense: 6 Tab; Refill: 0      Followup: Return if symptoms worsen or fail to improve.          107

## 2019-05-27 ENCOUNTER — PATIENT MESSAGE (OUTPATIENT)
Dept: MEDICAL GROUP | Facility: MEDICAL CENTER | Age: 74
End: 2019-05-27

## 2019-05-27 DIAGNOSIS — N52.9 ERECTILE DYSFUNCTION, UNSPECIFIED ERECTILE DYSFUNCTION TYPE: ICD-10-CM

## 2019-05-27 DIAGNOSIS — G25.81 RESTLESS LEG SYNDROME: ICD-10-CM

## 2019-05-28 RX ORDER — TADALAFIL 20 MG/1
20 TABLET ORAL PRN
Qty: 4 TAB | Refills: 5 | Status: SHIPPED | OUTPATIENT
Start: 2019-05-28 | End: 2019-12-09 | Stop reason: SDUPTHER

## 2019-05-28 RX ORDER — LANSOPRAZOLE 30 MG/1
30 CAPSULE, DELAYED RELEASE ORAL DAILY
Qty: 90 CAP | Refills: 3 | Status: SHIPPED | OUTPATIENT
Start: 2019-05-28 | End: 2020-06-05

## 2019-05-28 NOTE — TELEPHONE ENCOUNTER
From: Johnny Iarheta  To: Leisa Fink P.A.-C.  Sent: 5/27/2019 7:19 PM PDT  Subject: Prescription Question    Hello  Time for refill for Lansoprazole, Cialis, and Ambien.  Thank you,  Solitario Iraheta

## 2019-06-04 ENCOUNTER — OFFICE VISIT (OUTPATIENT)
Dept: MEDICAL GROUP | Facility: MEDICAL CENTER | Age: 74
End: 2019-06-04
Payer: OTHER MISCELLANEOUS

## 2019-06-04 VITALS
WEIGHT: 190.6 LBS | RESPIRATION RATE: 14 BRPM | BODY MASS INDEX: 23.7 KG/M2 | DIASTOLIC BLOOD PRESSURE: 72 MMHG | TEMPERATURE: 97.6 F | OXYGEN SATURATION: 95 % | HEIGHT: 75 IN | SYSTOLIC BLOOD PRESSURE: 114 MMHG | HEART RATE: 77 BPM

## 2019-06-04 DIAGNOSIS — G25.81 RESTLESS LEG SYNDROME: ICD-10-CM

## 2019-06-04 DIAGNOSIS — K76.0 FATTY LIVER: ICD-10-CM

## 2019-06-04 DIAGNOSIS — F51.01 PRIMARY INSOMNIA: ICD-10-CM

## 2019-06-04 DIAGNOSIS — E11.9 DIABETES MELLITUS TYPE 2, DIET-CONTROLLED (HCC): ICD-10-CM

## 2019-06-04 DIAGNOSIS — E78.49 OTHER HYPERLIPIDEMIA: ICD-10-CM

## 2019-06-04 PROBLEM — J40 BRONCHITIS: Status: RESOLVED | Noted: 2019-03-15 | Resolved: 2019-06-04

## 2019-06-04 PROCEDURE — 99214 OFFICE O/P EST MOD 30 MIN: CPT | Performed by: PHYSICIAN ASSISTANT

## 2019-06-04 RX ORDER — ZOLPIDEM TARTRATE 10 MG/1
10 TABLET ORAL NIGHTLY PRN
Qty: 30 TAB | Refills: 2 | Status: SHIPPED
Start: 2019-06-04 | End: 2019-07-04

## 2019-06-04 NOTE — PROGRESS NOTES
Subjective:   Johnny Iraheta is a 74 y.o. male here today for follow up on chronic conditions    Insomnia  Chronic, prn use, not daily, usually cuts in half, no abnormal side effects or adverse reactions,  verified  Controlled Substance Assessment:     - Is the medication, if previously prescribed, working as intended and expected to treat   the patients symptoms: yes.     - Does the patient have a history of substance abuse: no.     - has the patient demonstrated aberrant behavior or intoxication: no.     - Is there reason to believe that the patient is not using medication as prescribed or diverting the medication: no.     - Is there reason to believe that the patient is currently misusing this medication or addicted to the controlled substance: no.     - Is it necessary to verify that controlled substances, other that those authorized under the treatment plan, are not present in the body of this patient: no.    - Are there any blood or urine test that indicate inappropriate use of the medication or other controlled substances : no.     - I have reviewed the . Does the  report irregular/inconsistent medication use or indicate that the medication is being used inappropriately or that the patient is using another controlled substance not prescribed or known to me: no.     - Is there reason to believe that the patient is using other drugs, including alcohol, prescription or illicit drugs, that may interact negatively with controlled substance or have not been prescribed by a practitioner who is treating the patient: no.     - Are there any known early refill attempts or claims that medication has been lost or stolen: no.     - Are there are any major changes in the patient's mental or physical health that would affect the medical appropriateness of this medication: no.     - Has the patient increased the dose of medication without provider authorization: no.    - Has the patient been reluctant to cooperate  with any exam, analysis or test recommended by me: no.     - Has the patient demonstrated reluctance to stop or reduce use of the medicine: no.     - Has the patient requested or demanded a controlled substance that is likely to be abused or cause dependency or addiction: no.     - Is there any other evidence that the patient is chronically using opioids, misusing, abusing, illegally using or addicted to any drug or failing to comply with the instructions of the practitioner concerning the use of the controlled substance: no    - Are there any other factors that the practitioner determines is necessary to make an informed professional judgment concerning the medical appropriateness of the prescription: no.     Restless leg syndrome  Taking prn gabapentin, no change    Diabetes mellitus type 2, diet-controlled (HCC)  Working on diet, has lost about 5 pounds, will get labs when he has time         Current medicines (including changes today)  Current Outpatient Prescriptions   Medication Sig Dispense Refill   • zolpidem (AMBIEN) 10 MG Tab Take 1 Tab by mouth at bedtime as needed for Sleep for up to 30 days. 30 Tab 2   • lansoprazole (PREVACID) 30 MG CAPSULE DELAYED RELEASE Take 1 Cap by mouth every day. 90 Cap 3   • tadalafil (CIALIS) 20 MG tablet Take 1 Tab by mouth as needed for Erectile Dysfunction. 4 Tab 5   • albuterol 108 (90 Base) MCG/ACT Aero Soln inhalation aerosol Inhale 2 Puffs by mouth every 6 hours as needed for Shortness of Breath. 8.5 g 3   • ADVAIR DISKUS 100-50 MCG/DOSE AEROSOL POWDER, BREATH ACTIVATED INHALE ONE PUFF BY MOUTH TWICE A DAY - RINSE MOUTH AFTER EACH USE 60 Inhaler 3   • montelukast (SINGULAIR) 10 MG Tab Take 1 Tab by mouth every day. 90 Tab 3   • gabapentin (NEURONTIN) 100 MG Cap Take 1 Cap by mouth 3 times a day. 90 Cap 6   • alfuzosin (UROXATRAL) 10 MG SR tablet Take 1 Tab by mouth every day. 90 Tab 3   • vitamin D (CHOLECALCIFEROL) 1000 UNIT Tab Take 1,000 Units by mouth every day.    "  • fluticasone (FLONASE) 50 MCG/ACT nasal spray Spray 1 Spray in nose every day.     • multivitamin (THERAGRAN) Tab Take 1 Tab by mouth every day.     • Red Yeast Rice Extract (RED YEAST RICE PO) Take  by mouth.     • Omega-3 Fatty Acids (FISH OIL) 1000 MG Cap capsule Take 1,000 mg by mouth 3 times a day, with meals.       No current facility-administered medications for this visit.      He  has a past medical history of Allergic rhinitis; Asthma; Asymptomatic stenosis of right carotid artery; BPH (benign prostatic hyperplasia); Erectile dysfunction; Fatty liver; GERD (gastroesophageal reflux disease); Insomnia; and Restless leg syndrome.    ROS   No fever/chills. No weight change. No headache/dizziness. No focal weakness. No sore throat, nasal congestion, ear pain. No chest pain, no shortness of breath, difficulty breathing. No n/v/d/c or abdominal pain. No urinary complaint. No rash or skin lesion. No joint pain or swelling.     Objective:     /72 (BP Location: Left arm, Patient Position: Sitting, BP Cuff Size: Adult)   Pulse 77   Temp 36.4 °C (97.6 °F) (Temporal)   Resp 14   Ht 1.905 m (6' 3\")   Wt 86.5 kg (190 lb 9.6 oz)   SpO2 95%  Body mass index is 23.82 kg/m².   Physical Exam:  Constitutional: WDWN, NAD  Skin: Warm, dry, good turgor, no rashes in visible areas.  Psych: Alert and oriented x3, normal affect and mood.    Assessment and Plan:   The following treatment plan was discussed    1. Primary insomnia    - zolpidem (AMBIEN) 10 MG Tab; Take 1 Tab by mouth at bedtime as needed for Sleep for up to 30 days.  Dispense: 30 Tab; Refill: 2    2. Other hyperlipidemia    - Lipid Profile; Future    3. Diabetes mellitus type 2, diet-controlled (HCC)    - HEMOGLOBIN A1C; Future    4. Fatty liver    - Comp Metabolic Panel; Future    5. Restless leg syndrome  Cont current      Followup: 3-6 months         "

## 2019-06-04 NOTE — ASSESSMENT & PLAN NOTE
Chronic, prn use, not daily, usually cuts in half, no abnormal side effects or adverse reactions,  verified  Controlled Substance Assessment:     - Is the medication, if previously prescribed, working as intended and expected to treat   the patients symptoms: yes.     - Does the patient have a history of substance abuse: no.     - has the patient demonstrated aberrant behavior or intoxication: no.     - Is there reason to believe that the patient is not using medication as prescribed or diverting the medication: no.     - Is there reason to believe that the patient is currently misusing this medication or addicted to the controlled substance: no.     - Is it necessary to verify that controlled substances, other that those authorized under the treatment plan, are not present in the body of this patient: no.    - Are there any blood or urine test that indicate inappropriate use of the medication or other controlled substances : no.     - I have reviewed the . Does the  report irregular/inconsistent medication use or indicate that the medication is being used inappropriately or that the patient is using another controlled substance not prescribed or known to me: no.     - Is there reason to believe that the patient is using other drugs, including alcohol, prescription or illicit drugs, that may interact negatively with controlled substance or have not been prescribed by a practitioner who is treating the patient: no.     - Are there any known early refill attempts or claims that medication has been lost or stolen: no.     - Are there are any major changes in the patient's mental or physical health that would affect the medical appropriateness of this medication: no.     - Has the patient increased the dose of medication without provider authorization: no.    - Has the patient been reluctant to cooperate with any exam, analysis or test recommended by me: no.     - Has the patient demonstrated reluctance to stop  or reduce use of the medicine: no.     - Has the patient requested or demanded a controlled substance that is likely to be abused or cause dependency or addiction: no.     - Is there any other evidence that the patient is chronically using opioids, misusing, abusing, illegally using or addicted to any drug or failing to comply with the instructions of the practitioner concerning the use of the controlled substance: no    - Are there any other factors that the practitioner determines is necessary to make an informed professional judgment concerning the medical appropriateness of the prescription: no.

## 2019-07-01 ENCOUNTER — HOSPITAL ENCOUNTER (OUTPATIENT)
Dept: LAB | Facility: MEDICAL CENTER | Age: 74
End: 2019-07-01
Attending: PHYSICIAN ASSISTANT
Payer: OTHER MISCELLANEOUS

## 2019-07-01 DIAGNOSIS — E78.49 OTHER HYPERLIPIDEMIA: ICD-10-CM

## 2019-07-01 DIAGNOSIS — K76.0 FATTY LIVER: ICD-10-CM

## 2019-07-01 DIAGNOSIS — E11.9 DIABETES MELLITUS TYPE 2, DIET-CONTROLLED (HCC): ICD-10-CM

## 2019-07-01 LAB
ALBUMIN SERPL BCP-MCNC: 4.5 G/DL (ref 3.2–4.9)
ALBUMIN/GLOB SERPL: 1.8 G/DL
ALP SERPL-CCNC: 63 U/L (ref 30–99)
ALT SERPL-CCNC: 18 U/L (ref 2–50)
ANION GAP SERPL CALC-SCNC: 6 MMOL/L (ref 0–11.9)
AST SERPL-CCNC: 19 U/L (ref 12–45)
BILIRUB SERPL-MCNC: 0.4 MG/DL (ref 0.1–1.5)
BUN SERPL-MCNC: 21 MG/DL (ref 8–22)
CALCIUM SERPL-MCNC: 9.4 MG/DL (ref 8.5–10.5)
CHLORIDE SERPL-SCNC: 106 MMOL/L (ref 96–112)
CHOLEST SERPL-MCNC: 155 MG/DL (ref 100–199)
CO2 SERPL-SCNC: 27 MMOL/L (ref 20–33)
CREAT SERPL-MCNC: 0.94 MG/DL (ref 0.5–1.4)
EST. AVERAGE GLUCOSE BLD GHB EST-MCNC: 143 MG/DL
FASTING STATUS PATIENT QL REPORTED: NORMAL
GLOBULIN SER CALC-MCNC: 2.5 G/DL (ref 1.9–3.5)
GLUCOSE SERPL-MCNC: 103 MG/DL (ref 65–99)
HBA1C MFR BLD: 6.6 % (ref 0–5.6)
HDLC SERPL-MCNC: 27 MG/DL
LDLC SERPL CALC-MCNC: 107 MG/DL
POTASSIUM SERPL-SCNC: 4.5 MMOL/L (ref 3.6–5.5)
PROT SERPL-MCNC: 7 G/DL (ref 6–8.2)
SODIUM SERPL-SCNC: 139 MMOL/L (ref 135–145)
TRIGL SERPL-MCNC: 107 MG/DL (ref 0–149)

## 2019-07-01 PROCEDURE — 36415 COLL VENOUS BLD VENIPUNCTURE: CPT

## 2019-07-01 PROCEDURE — 83036 HEMOGLOBIN GLYCOSYLATED A1C: CPT

## 2019-07-01 PROCEDURE — 80053 COMPREHEN METABOLIC PANEL: CPT

## 2019-07-01 PROCEDURE — 80061 LIPID PANEL: CPT

## 2019-08-15 ENCOUNTER — APPOINTMENT (OUTPATIENT)
Dept: RADIOLOGY | Facility: IMAGING CENTER | Age: 74
End: 2019-08-15
Attending: NURSE PRACTITIONER
Payer: OTHER MISCELLANEOUS

## 2019-08-15 ENCOUNTER — OFFICE VISIT (OUTPATIENT)
Dept: URGENT CARE | Facility: CLINIC | Age: 74
End: 2019-08-15
Payer: OTHER MISCELLANEOUS

## 2019-08-15 VITALS
HEIGHT: 75 IN | DIASTOLIC BLOOD PRESSURE: 56 MMHG | TEMPERATURE: 97.9 F | OXYGEN SATURATION: 94 % | HEART RATE: 81 BPM | SYSTOLIC BLOOD PRESSURE: 112 MMHG | BODY MASS INDEX: 23.13 KG/M2 | RESPIRATION RATE: 16 BRPM | WEIGHT: 186 LBS
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
## 2019-08-15 DIAGNOSIS — M25.511 ACUTE PAIN OF RIGHT SHOULDER: ICD-10-CM

## 2019-08-15 PROCEDURE — 99214 OFFICE O/P EST MOD 30 MIN: CPT | Performed by: NURSE PRACTITIONER

## 2019-08-15 PROCEDURE — 73030 X-RAY EXAM OF SHOULDER: CPT | Mod: TC,RT | Performed by: NURSE PRACTITIONER

## 2019-08-15 RX ORDER — ZOLPIDEM TARTRATE 10 MG/1
1 TABLET ORAL
COMMUNITY
Start: 2017-09-08 | End: 2019-12-19 | Stop reason: SDUPTHER

## 2019-08-15 RX ORDER — SIMVASTATIN 5 MG
5 TABLET ORAL NIGHTLY
COMMUNITY

## 2019-08-15 ASSESSMENT — ENCOUNTER SYMPTOMS
DOUBLE VISION: 0
WHEEZING: 0
CHILLS: 0
VOMITING: 0
LOSS OF CONSCIOUSNESS: 0
FEVER: 0
SHORTNESS OF BREATH: 0
TINGLING: 0
SPEECH CHANGE: 0
FALLS: 1
VISUAL CHANGE: 0
PHOTOPHOBIA: 0
SENSORY CHANGE: 0
BLURRED VISION: 0
NAUSEA: 0
NUMBNESS: 0
BOWEL INCONTINENCE: 0
PALPITATIONS: 0

## 2019-08-15 NOTE — PROGRESS NOTES
"Subjective:   Johnny Iraheta is a 74 y.o. male who presents for Shoulder Pain (right shoulder x2 days)        Fall   The accident occurred 2 days ago. Fall occurred: Was walking and accidentally tripped over a box. He landed on carpet. There was no blood loss. The point of impact was the right shoulder. The pain is present in the right shoulder. The pain is moderate. The symptoms are aggravated by extension, movement, rotation and use of injured limb. Pertinent negatives include no bowel incontinence, fever, hearing loss, hematuria, loss of consciousness, nausea, numbness, tingling, visual change or vomiting. He has tried NSAID (KT tape) for the symptoms. The treatment provided mild relief.      States with prior injury to the right shoulder several years ago.    Review of Systems   Constitutional: Negative for chills and fever.   Eyes: Negative for blurred vision, double vision and photophobia.   Respiratory: Negative for shortness of breath and wheezing.    Cardiovascular: Negative for chest pain and palpitations.   Gastrointestinal: Negative for bowel incontinence, nausea and vomiting.   Genitourinary: Negative for hematuria.   Musculoskeletal: Positive for falls and joint pain (right shoulder).   Skin: Negative for itching and rash.   Neurological: Negative for tingling, sensory change, speech change, loss of consciousness and numbness.     Patient's PMH, SocHx, SurgHx, FamHx, Drug allergies and medications reviewed.     Objective:   /56 (BP Location: Left arm, Patient Position: Sitting)   Pulse 81   Temp 36.6 °C (97.9 °F)   Resp 16   Ht 1.905 m (6' 3\")   Wt 84.4 kg (186 lb)   SpO2 94%   BMI 23.25 kg/m²   Physical Exam   Constitutional: He appears well-developed and well-nourished. No distress.   HENT:   Head: Normocephalic.   Right Ear: Hearing normal.   Left Ear: Hearing normal.   Nose: Nose normal.   Eyes: Pupils are equal, round, and reactive to light. Conjunctivae, EOM and lids are " normal.   Neck: Normal range of motion.   Cardiovascular: Normal rate, regular rhythm and normal heart sounds.   Pulses:       Radial pulses are 2+ on the right side, and 2+ on the left side.   Pulmonary/Chest: Effort normal and breath sounds normal. No respiratory distress. He has no decreased breath sounds. He has no wheezes.   Musculoskeletal:        Right shoulder: He exhibits decreased range of motion, tenderness, swelling and pain. He exhibits no spasm and normal strength.        Arms:  Decreased ROM when attempting to lift right shoulder above head.   Neurological: He is alert.   Distal neurovascular intact - right arm   Skin: Skin is warm. No bruising and no rash noted. He is not diaphoretic. No erythema.   Psychiatric: He has a normal mood and affect. His speech is normal and behavior is normal. Judgment and thought content normal.   Vitals reviewed.        Assessment/Plan:   Assessment    1. Fall, initial encounter  - DX-SHOULDER 2+ RIGHT; Future  - REFERRAL TO ORTHOPEDICS    2. Acute pain of right shoulder  - REFERRAL TO ORTHOPEDICS    Other orders  - simvastatin (ZOCOR) 5 MG Tab; Take 5 mg by mouth every evening.  - zolpidem (AMBIEN) 10 MG Tab; Take 1 tablet by mouth.    Xray negative  Rest affected area - patient requesting shoulder immobilizer   You may apply ice packs to the affected area up to 4 times daily for 30 minutes at a time  May take over-the-counter Ibuprofen 600-800 mg every 8 hours as needed for pain  Referral to Ortho for follow up or if pain continues    Differential diagnosis, natural history, supportive care, and indications for immediate follow-up discussed.     **Please note that all invasive procedures during this visit were performed by myself and/or the Medical Assistant under the supervision of the PA or MD in office**

## 2019-10-31 RX ORDER — SIMVASTATIN 20 MG
TABLET ORAL
Qty: 90 TAB | Refills: 1 | Status: SHIPPED | OUTPATIENT
Start: 2019-10-31 | End: 2020-04-16 | Stop reason: SDUPTHER

## 2019-11-08 ENCOUNTER — PATIENT MESSAGE (OUTPATIENT)
Dept: MEDICAL GROUP | Facility: MEDICAL CENTER | Age: 74
End: 2019-11-08

## 2019-11-08 DIAGNOSIS — Z87.09 HISTORY OF ASTHMA: ICD-10-CM

## 2019-11-17 DIAGNOSIS — J45.40 MODERATE PERSISTENT ASTHMA WITHOUT COMPLICATION: ICD-10-CM

## 2019-11-18 RX ORDER — MONTELUKAST SODIUM 10 MG/1
TABLET ORAL
Qty: 90 TAB | Refills: 3 | Status: SHIPPED | OUTPATIENT
Start: 2019-11-18 | End: 2020-04-16 | Stop reason: SDUPTHER

## 2019-12-09 DIAGNOSIS — N52.9 ERECTILE DYSFUNCTION, UNSPECIFIED ERECTILE DYSFUNCTION TYPE: ICD-10-CM

## 2019-12-09 RX ORDER — TADALAFIL 20 MG/1
20 TABLET ORAL PRN
Qty: 4 TAB | Refills: 5 | Status: SHIPPED | OUTPATIENT
Start: 2019-12-09 | End: 2020-05-26

## 2019-12-19 ENCOUNTER — OFFICE VISIT (OUTPATIENT)
Dept: MEDICAL GROUP | Facility: MEDICAL CENTER | Age: 74
End: 2019-12-19
Payer: OTHER MISCELLANEOUS

## 2019-12-19 VITALS
WEIGHT: 189 LBS | RESPIRATION RATE: 16 BRPM | DIASTOLIC BLOOD PRESSURE: 70 MMHG | HEART RATE: 76 BPM | SYSTOLIC BLOOD PRESSURE: 134 MMHG | BODY MASS INDEX: 24.26 KG/M2 | HEIGHT: 74 IN | TEMPERATURE: 98.1 F | OXYGEN SATURATION: 92 %

## 2019-12-19 DIAGNOSIS — F51.01 PRIMARY INSOMNIA: ICD-10-CM

## 2019-12-19 DIAGNOSIS — E78.49 OTHER HYPERLIPIDEMIA: ICD-10-CM

## 2019-12-19 DIAGNOSIS — K76.0 FATTY LIVER: ICD-10-CM

## 2019-12-19 DIAGNOSIS — E11.9 DIABETES MELLITUS TYPE 2, DIET-CONTROLLED (HCC): ICD-10-CM

## 2019-12-19 DIAGNOSIS — Z11.59 ENCOUNTER FOR HEPATITIS C SCREENING TEST FOR LOW RISK PATIENT: ICD-10-CM

## 2019-12-19 PROCEDURE — 99213 OFFICE O/P EST LOW 20 MIN: CPT | Performed by: PHYSICIAN ASSISTANT

## 2019-12-19 RX ORDER — AZITHROMYCIN 250 MG/1
TABLET, FILM COATED ORAL
Qty: 6 TAB | Refills: 1 | Status: SHIPPED | OUTPATIENT
Start: 2019-12-19

## 2019-12-19 RX ORDER — ZOLPIDEM TARTRATE 10 MG/1
10 TABLET ORAL NIGHTLY PRN
Qty: 30 TAB | Refills: 5 | Status: SHIPPED
Start: 2019-12-19 | End: 2020-01-18

## 2019-12-19 NOTE — ASSESSMENT & PLAN NOTE
Chronic, stable on current, due for refill,  verified, takes most nights of the week  Controlled Substance Assessment:     - Is the medication, if previously prescribed, working as intended and expected to treat   the patients symptoms: yes.     - Does the patient have a history of substance abuse: no.     - has the patient demonstrated aberrant behavior or intoxication: no.     - Is there reason to believe that the patient is not using medication as prescribed or diverting the medication: no.     - Is there reason to believe that the patient is currently misusing this medication or addicted to the controlled substance: no.     - Is it necessary to verify that controlled substances, other that those authorized under the treatment plan, are not present in the body of this patient: no.    - Are there any blood or urine test that indicate inappropriate use of the medication or other controlled substances : no.     - I have reviewed the . Does the  report irregular/inconsistent medication use or indicate that the medication is being used inappropriately or that the patient is using another controlled substance not prescribed or known to me: no.     - Is there reason to believe that the patient is using other drugs, including alcohol, prescription or illicit drugs, that may interact negatively with controlled substance or have not been prescribed by a practitioner who is treating the patient: no.     - Are there any known early refill attempts or claims that medication has been lost or stolen: no.     - Are there are any major changes in the patient's mental or physical health that would affect the medical appropriateness of this medication: no.     - Has the patient increased the dose of medication without provider authorization: no.    - Has the patient been reluctant to cooperate with any exam, analysis or test recommended by me: no.     - Has the patient demonstrated reluctance to stop or reduce use of the  medicine: no.     - Has the patient requested or demanded a controlled substance that is likely to be abused or cause dependency or addiction: no.     - Is there any other evidence that the patient is chronically using opioids, misusing, abusing, illegally using or addicted to any drug or failing to comply with the instructions of the practitioner concerning the use of the controlled substance: no    - Are there any other factors that the practitioner determines is necessary to make an informed professional judgment concerning the medical appropriateness of the prescription: no.

## 2019-12-19 NOTE — PROGRESS NOTES
Subjective:   Johnny Iraheta is a 74 y.o. male here today for f/u on insomnia    Insomnia  Chronic, stable on current, due for refill,  verified, takes most nights of the week  Controlled Substance Assessment:     - Is the medication, if previously prescribed, working as intended and expected to treat   the patients symptoms: yes.     - Does the patient have a history of substance abuse: no.     - has the patient demonstrated aberrant behavior or intoxication: no.     - Is there reason to believe that the patient is not using medication as prescribed or diverting the medication: no.     - Is there reason to believe that the patient is currently misusing this medication or addicted to the controlled substance: no.     - Is it necessary to verify that controlled substances, other that those authorized under the treatment plan, are not present in the body of this patient: no.    - Are there any blood or urine test that indicate inappropriate use of the medication or other controlled substances : no.     - I have reviewed the . Does the  report irregular/inconsistent medication use or indicate that the medication is being used inappropriately or that the patient is using another controlled substance not prescribed or known to me: no.     - Is there reason to believe that the patient is using other drugs, including alcohol, prescription or illicit drugs, that may interact negatively with controlled substance or have not been prescribed by a practitioner who is treating the patient: no.     - Are there any known early refill attempts or claims that medication has been lost or stolen: no.     - Are there are any major changes in the patient's mental or physical health that would affect the medical appropriateness of this medication: no.     - Has the patient increased the dose of medication without provider authorization: no.    - Has the patient been reluctant to cooperate with any exam, analysis or test  recommended by me: no.     - Has the patient demonstrated reluctance to stop or reduce use of the medicine: no.     - Has the patient requested or demanded a controlled substance that is likely to be abused or cause dependency or addiction: no.     - Is there any other evidence that the patient is chronically using opioids, misusing, abusing, illegally using or addicted to any drug or failing to comply with the instructions of the practitioner concerning the use of the controlled substance: no    - Are there any other factors that the practitioner determines is necessary to make an informed professional judgment concerning the medical appropriateness of the prescription: no.        Current medicines (including changes today)  Current Outpatient Medications   Medication Sig Dispense Refill   • azithromycin (ZITHROMAX) 250 MG Tab Use chelita as directed 6 Tab 1   • zolpidem (AMBIEN) 10 MG Tab Take 1 Tab by mouth at bedtime as needed for Sleep for up to 30 days. 30 Tab 5   • tadalafil (CIALIS) 20 MG tablet Take 1 Tab by mouth as needed for Erectile Dysfunction. 4 Tab 5   • montelukast (SINGULAIR) 10 MG Tab TAKE ONE TABLET BY MOUTH DAILY 90 Tab 3   • fluticasone-salmeterol (ADVAIR DISKUS) 100-50 MCG/DOSE AEROSOL POWDER, BREATH ACTIVATED INHALE ONE PUFF BY MOUTH TWICE A DAY - RINSE MOUTH AFTER EACH USE 60 Inhaler 3   • simvastatin (ZOCOR) 20 MG Tab TAKE ONE TABLET BY MOUTH EVERY EVENING 90 Tab 1   • lansoprazole (PREVACID) 30 MG CAPSULE DELAYED RELEASE Take 1 Cap by mouth every day. 90 Cap 3   • albuterol 108 (90 Base) MCG/ACT Aero Soln inhalation aerosol Inhale 2 Puffs by mouth every 6 hours as needed for Shortness of Breath. 8.5 g 3   • gabapentin (NEURONTIN) 100 MG Cap Take 1 Cap by mouth 3 times a day. 90 Cap 6   • alfuzosin (UROXATRAL) 10 MG SR tablet Take 1 Tab by mouth every day. 90 Tab 3   • vitamin D (CHOLECALCIFEROL) 1000 UNIT Tab Take 1,000 Units by mouth every day.     • fluticasone (FLONASE) 50 MCG/ACT nasal  "spray Spray 1 Spray in nose every day.     • multivitamin (THERAGRAN) Tab Take 1 Tab by mouth every day.     • Red Yeast Rice Extract (RED YEAST RICE PO) Take  by mouth.     • Omega-3 Fatty Acids (FISH OIL) 1000 MG Cap capsule Take 1,000 mg by mouth 3 times a day, with meals.     • simvastatin (ZOCOR) 5 MG Tab Take 5 mg by mouth every evening.       No current facility-administered medications for this visit.      He  has a past medical history of Allergic rhinitis, Asthma, Asymptomatic stenosis of right carotid artery, BPH (benign prostatic hyperplasia), Erectile dysfunction, Fatty liver, GERD (gastroesophageal reflux disease), Insomnia, and Restless leg syndrome.    ROS   No fever/chills. No weight change. No headache/dizziness. No focal weakness. No sore throat, nasal congestion, ear pain. No chest pain, no shortness of breath, difficulty breathing. No n/v/d/c or abdominal pain. No urinary complaint. No rash or skin lesion. No joint pain or swelling.       Objective:     /70 (BP Location: Right arm, Patient Position: Sitting, BP Cuff Size: Adult long)   Pulse 76   Temp 36.7 °C (98.1 °F) (Temporal)   Resp 16   Ht 1.88 m (6' 2\")   Wt 85.7 kg (189 lb)   SpO2 92%  Body mass index is 24.27 kg/m².   Physical Exam:  Constitutional: WDWN, NAD  Skin: Warm, dry, good turgor, no rashes in visible areas.  Psych: Alert and oriented x3, normal affect and mood.    Assessment and Plan:   The following treatment plan was discussed    1. Primary insomnia  [facility] Aware web site evaluation: I have obtained and reviewed patient utilization report from [facility] pharmacy database prior to writing prescription for controlled substance II, III or IV. Based on the report and my clinical assessment the prescription is medically necessary.   Patient is cautioned on sedation potential of ambien medication; no drinking, driving or operating heavy machinery while on this medication.  - zolpidem (AMBIEN) 10 MG Tab; Take 1 " Tab by mouth at bedtime as needed for Sleep for up to 30 days.  Dispense: 30 Tab; Refill: 5    2. Other hyperlipidemia    - Lipid Profile; Future    3. Diabetes mellitus type 2, diet-controlled (HCC)    - HEMOGLOBIN A1C; Future  - Comp Metabolic Panel; Future  - MICROALBUMIN CREAT RATIO URINE; Future    4. Fatty liver      5. Encounter for hepatitis C screening test for low risk patient    - HEP C VIRUS ANTIBODY; Future      Followup: annual wellness

## 2020-01-30 ENCOUNTER — PATIENT MESSAGE (OUTPATIENT)
Dept: MEDICAL GROUP | Facility: MEDICAL CENTER | Age: 75
End: 2020-01-30

## 2020-01-30 DIAGNOSIS — G25.81 RESTLESS LEG SYNDROME: ICD-10-CM

## 2020-01-30 RX ORDER — GABAPENTIN 100 MG/1
100 CAPSULE ORAL 3 TIMES DAILY
Qty: 90 CAP | Refills: 0 | Status: SHIPPED | OUTPATIENT
Start: 2020-01-30 | End: 2020-04-07

## 2020-02-10 ENCOUNTER — TELEPHONE (OUTPATIENT)
Dept: MEDICAL GROUP | Facility: MEDICAL CENTER | Age: 75
End: 2020-02-10

## 2020-02-10 NOTE — TELEPHONE ENCOUNTER
DOCUMENTATION OF PAR STATUS:    1. Name of Medication & Dose: Cialis 20 mg    2. Name of Prescription Coverage Company & phone #: [email or web address]    3. Date Prior Auth Submitted: 2/10/2020    4. What information was given to obtain insurance decision? Erectile dysfunction    5. Prior Auth Status? Pending    6. Patient Notified: no

## 2020-02-11 ENCOUNTER — OFFICE VISIT (OUTPATIENT)
Dept: URGENT CARE | Facility: CLINIC | Age: 75
End: 2020-02-11
Payer: OTHER MISCELLANEOUS

## 2020-02-11 VITALS
WEIGHT: 187.39 LBS | TEMPERATURE: 98.7 F | BODY MASS INDEX: 23.3 KG/M2 | SYSTOLIC BLOOD PRESSURE: 134 MMHG | OXYGEN SATURATION: 99 % | HEART RATE: 82 BPM | RESPIRATION RATE: 14 BRPM | DIASTOLIC BLOOD PRESSURE: 74 MMHG | HEIGHT: 75 IN

## 2020-02-11 DIAGNOSIS — J45.909 UNCOMPLICATED ASTHMA, UNSPECIFIED ASTHMA SEVERITY, UNSPECIFIED WHETHER PERSISTENT: ICD-10-CM

## 2020-02-11 DIAGNOSIS — R05.9 COUGH: ICD-10-CM

## 2020-02-11 DIAGNOSIS — J06.9 UPPER RESPIRATORY TRACT INFECTION, UNSPECIFIED TYPE: ICD-10-CM

## 2020-02-11 PROCEDURE — 99214 OFFICE O/P EST MOD 30 MIN: CPT | Performed by: PHYSICIAN ASSISTANT

## 2020-02-11 RX ORDER — DOXYCYCLINE HYCLATE 100 MG
100 TABLET ORAL 2 TIMES DAILY
Qty: 14 TAB | Refills: 0 | Status: SHIPPED | OUTPATIENT
Start: 2020-02-11 | End: 2020-02-18

## 2020-02-11 RX ORDER — ZOLPIDEM TARTRATE 10 MG/1
TABLET ORAL
COMMUNITY
Start: 2020-02-05

## 2020-02-11 RX ORDER — FLUTICASONE PROPIONATE 50 MCG
1 SPRAY, SUSPENSION (ML) NASAL DAILY
Qty: 16 G | Refills: 0 | Status: SHIPPED | OUTPATIENT
Start: 2020-02-11

## 2020-02-11 ASSESSMENT — ENCOUNTER SYMPTOMS
SORE THROAT: 1
NECK PAIN: 0
SHORTNESS OF BREATH: 0
CHILLS: 0
EYE REDNESS: 0
EYE DISCHARGE: 0
RHINORRHEA: 1
WHEEZING: 1
MYALGIAS: 0
VOMITING: 0
HEADACHES: 0
FEVER: 0
DIARRHEA: 0
DIZZINESS: 0
SPUTUM PRODUCTION: 1
COUGH: 1

## 2020-02-11 NOTE — PROGRESS NOTES
"Subjective:      Solitario Iraheta is a 74 y.o. male who presents with Cough (x 1 day / conegstion / )            Cough   This is a new problem. The current episode started yesterday. The problem has been gradually worsening. The problem occurs every few minutes. The cough is non-productive (Rare sputum production. ). Associated symptoms include ear congestion, nasal congestion, postnasal drip, rhinorrhea, a sore throat and wheezing. Pertinent negatives include no chest pain, chills, ear pain, eye redness, fever, headaches, myalgias, rash or shortness of breath. The symptoms are aggravated by lying down. He has tried OTC cough suppressant for the symptoms. The treatment provided mild relief. His past medical history is significant for asthma and bronchitis.   Patient does report history of asthma of which she is currently utilizing Advair daily along with rare usage of albuterol.  He expresses his main concern is he is about to travel out of town and is about to leave on a flight and does report long history of bronchitis in the past.    Review of Systems   Constitutional: Positive for malaise/fatigue. Negative for chills and fever.   HENT: Positive for congestion, postnasal drip, rhinorrhea and sore throat. Negative for ear discharge and ear pain.    Eyes: Negative for discharge and redness.   Respiratory: Positive for cough, sputum production and wheezing. Negative for shortness of breath.    Cardiovascular: Negative for chest pain and leg swelling.   Gastrointestinal: Negative for diarrhea and vomiting.   Genitourinary: Negative for dysuria.   Musculoskeletal: Negative for myalgias and neck pain.   Skin: Negative for itching and rash.   Neurological: Negative for dizziness and headaches.          Objective:     /74   Pulse 82   Temp 37.1 °C (98.7 °F) (Temporal)   Resp 14   Ht 1.905 m (6' 3\")   Wt 85 kg (187 lb 6.3 oz)   SpO2 99%   BMI 23.42 kg/m²    PMH:  has a past medical history of Allergic " rhinitis, Asthma, Asymptomatic stenosis of right carotid artery, BPH (benign prostatic hyperplasia), Erectile dysfunction, Fatty liver, GERD (gastroesophageal reflux disease), Insomnia, and Restless leg syndrome.  MEDS: Reviewed .   ALLERGIES:   Allergies   Allergen Reactions   • Sulfa Drugs Rash and Shortness of Breath   • Other Food Shortness of Breath     MSG / shortness of breath     SURGHX: No past surgical history on file.  SOCHX:  reports that he quit smoking about 11 years ago. His smoking use included cigarettes. He has a 15.00 pack-year smoking history. He has never used smokeless tobacco. He reports current alcohol use. He reports current drug use. Drug: Marijuana.  FH: Family history was reviewed, no pertinent findings to report    Physical Exam  Vitals signs reviewed.   Constitutional:       Appearance: Normal appearance. He is well-developed.   HENT:      Head: Normocephalic and atraumatic.      Ears:      Comments: Bilateral clear middle ear effusions.     Nose:      Comments: Rhinorrhea noted.     Mouth/Throat:      Comments: Posterior oropharynx is erythematous, positive postnasal drainage.  No evidence of exudate.  Eyes:      Conjunctiva/sclera: Conjunctivae normal.      Pupils: Pupils are equal, round, and reactive to light.   Neck:      Musculoskeletal: Normal range of motion and neck supple.   Cardiovascular:      Rate and Rhythm: Normal rate and regular rhythm.      Heart sounds: No murmur.   Pulmonary:      Effort: Pulmonary effort is normal. No respiratory distress.      Breath sounds: Normal breath sounds.   Musculoskeletal: Normal range of motion.      Right lower leg: No edema.      Left lower leg: No edema.   Lymphadenopathy:      Cervical: No cervical adenopathy.   Skin:     General: Skin is warm.      Findings: No rash.   Neurological:      Mental Status: He is alert and oriented to person, place, and time.   Psychiatric:         Mood and Affect: Mood normal.         Behavior: Behavior  "normal.         Thought Content: Thought content normal.                 Assessment/Plan:       1. Upper respiratory tract infection, unspecified type  2. Cough  - fluticasone (FLONASE) 50 MCG/ACT nasal spray; Spray 1 Spray in nose every day.  Dispense: 16 g; Refill: 0    3. Uncomplicated asthma, unspecified asthma severity, unspecified whether persistent    Patient adamantly concerned about upcoming travels and concern about being \"stuck \"in a different city.  Contingent antibiotic was written for patient to start only based on guidelines discussed.  We will also add fluticasone for mucus.  He is to continue with Advair daily and continue with albuterol only as needed.  It was explained today that due to the viral nature of the pt's illness, we will treat symptomatically today.   Encouraged OTC supportive meds PRN. Humidification, increase fluids, avoid night time dairy.   Discussed side effects of OTC meds and any prescribed.  Given precautionary s/sx that mandate immediate follow up and evaluation in the ED. Advised of risks of not doing so.    DDX, Supportive care, and indications for immediate follow-up discussed with patient.    Instructed to return to clinic or nearest emergency department if we are not available for any change in condition, further concerns, or worsening of symptoms.    The patient  and/or guardian demonstrated a good understanding and agreed with the treatment plan.    Please note that this dictation was created using voice recognition software. I have made every reasonable attempt to correct obvious errors, but I expect that there are errors of grammar and possibly content that I did not discover before finalizing the note.    "

## 2020-04-07 DIAGNOSIS — G25.81 RESTLESS LEG SYNDROME: ICD-10-CM

## 2020-04-08 DIAGNOSIS — N40.0 BENIGN PROSTATIC HYPERPLASIA, UNSPECIFIED WHETHER LOWER URINARY TRACT SYMPTOMS PRESENT: ICD-10-CM

## 2020-04-08 RX ORDER — ALFUZOSIN HYDROCHLORIDE 10 MG/1
10 TABLET, EXTENDED RELEASE ORAL DAILY
Qty: 90 TAB | Refills: 3 | Status: SHIPPED | OUTPATIENT
Start: 2020-04-08 | End: 2020-04-15 | Stop reason: SDUPTHER

## 2020-04-08 RX ORDER — GABAPENTIN 100 MG/1
CAPSULE ORAL
Qty: 270 CAP | Refills: 2 | Status: SHIPPED | OUTPATIENT
Start: 2020-04-08

## 2020-04-15 ENCOUNTER — PATIENT MESSAGE (OUTPATIENT)
Dept: MEDICAL GROUP | Facility: MEDICAL CENTER | Age: 75
End: 2020-04-15

## 2020-04-15 DIAGNOSIS — Z87.09 HISTORY OF ASTHMA: ICD-10-CM

## 2020-04-15 DIAGNOSIS — J45.40 MODERATE PERSISTENT ASTHMA WITHOUT COMPLICATION: ICD-10-CM

## 2020-04-15 DIAGNOSIS — N40.0 BENIGN PROSTATIC HYPERPLASIA, UNSPECIFIED WHETHER LOWER URINARY TRACT SYMPTOMS PRESENT: ICD-10-CM

## 2020-04-15 DIAGNOSIS — J40 BRONCHITIS: ICD-10-CM

## 2020-04-15 NOTE — TELEPHONE ENCOUNTER
From: Johnny Ward Page  To: Leisa Fink P.A.-C.  Sent: 4/15/2020 2:30 PM PDT  Subject: Prescription Question    Good Afternoon,    I am doing fine. Been at our house in [place] the last six weeks and looks like will be here at least till early May. I have prescriptions I am running low on and would like to have them filled at;  [facility]  [id number]  [place], [place]  [phone]    The prescriptions are;  Advair 100/50  Simvastatin 20 MG  Montelukast 10 MG  Alfuzosin 10MG  Albuterol 90 MCG    Thank you

## 2020-04-16 RX ORDER — ALFUZOSIN HYDROCHLORIDE 10 MG/1
10 TABLET, EXTENDED RELEASE ORAL DAILY
Qty: 90 TAB | Refills: 1 | Status: SHIPPED | OUTPATIENT
Start: 2020-04-16 | End: 2020-10-26

## 2020-04-16 RX ORDER — ALBUTEROL SULFATE 90 UG/1
2 AEROSOL, METERED RESPIRATORY (INHALATION) EVERY 6 HOURS PRN
Qty: 8.5 G | Refills: 3 | Status: SHIPPED | OUTPATIENT
Start: 2020-04-16 | End: 2020-07-15

## 2020-04-16 RX ORDER — MONTELUKAST SODIUM 10 MG/1
TABLET ORAL
Qty: 90 TAB | Refills: 1 | Status: SHIPPED | OUTPATIENT
Start: 2020-04-16 | End: 2020-11-03

## 2020-04-16 RX ORDER — SIMVASTATIN 20 MG
20 TABLET ORAL EVERY EVENING
Qty: 90 TAB | Refills: 1 | Status: SHIPPED | OUTPATIENT
Start: 2020-04-16

## 2020-05-24 DIAGNOSIS — N52.9 ERECTILE DYSFUNCTION, UNSPECIFIED ERECTILE DYSFUNCTION TYPE: ICD-10-CM

## 2020-05-26 RX ORDER — TADALAFIL 20 MG/1
TABLET ORAL
Qty: 4 TAB | Refills: 3 | Status: SHIPPED | OUTPATIENT
Start: 2020-05-26

## 2020-06-05 RX ORDER — LANSOPRAZOLE 30 MG/1
CAPSULE, DELAYED RELEASE ORAL
Qty: 90 CAP | Refills: 0 | Status: SHIPPED | OUTPATIENT
Start: 2020-06-05 | End: 2020-08-10

## 2020-07-15 DIAGNOSIS — J45.40 MODERATE PERSISTENT ASTHMA WITHOUT COMPLICATION: ICD-10-CM

## 2020-07-15 DIAGNOSIS — J40 BRONCHITIS: ICD-10-CM

## 2020-08-10 RX ORDER — LANSOPRAZOLE 30 MG/1
CAPSULE, DELAYED RELEASE ORAL
Qty: 90 CAP | Refills: 3 | Status: SHIPPED | OUTPATIENT
Start: 2020-08-10

## 2020-11-03 DIAGNOSIS — J45.40 MODERATE PERSISTENT ASTHMA WITHOUT COMPLICATION: ICD-10-CM

## 2020-11-03 RX ORDER — MONTELUKAST SODIUM 10 MG/1
TABLET ORAL
Qty: 90 TAB | Refills: 1 | Status: SHIPPED | OUTPATIENT
Start: 2020-11-03

## 2024-02-27 NOTE — TELEPHONE ENCOUNTER
Received request via: Pharmacy    Was the patient seen in the last year in this department? Yes    Does the patient have an active prescription (recently filled or refills available) for medication(s) requested? No   (V5) oriented